# Patient Record
Sex: MALE | Race: WHITE | NOT HISPANIC OR LATINO | Employment: FULL TIME | URBAN - METROPOLITAN AREA
[De-identification: names, ages, dates, MRNs, and addresses within clinical notes are randomized per-mention and may not be internally consistent; named-entity substitution may affect disease eponyms.]

---
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
## 2018-01-02 ENCOUNTER — TELEPHONE (OUTPATIENT)
Dept: FAMILY MEDICINE | Facility: CLINIC | Age: 18
End: 2018-01-02

## 2018-01-31 NOTE — PROGRESS NOTES
"01/31/18 - Pt came in with mother and demanded an appt - pt was scheduled for the day before and was a no show.  Mother was very upset saying we messed up the appt.  We offered to work him in within the hour which they accepted.  20 minutes later mom left stating she needed to go as she had a different appointment leaving patient here to be seen.  At 9am mother called back to reschedule patient.  States that the patient walked out since he did not want to wait and she did not want to wait a long time for the reschedule.  We rescheduled the patient for next Friday 2/9 at 9am for 40 minutes, which was repeated back by the mother to eliminate confusion.     Lavern Wolfe, Medical Assistant    SUBJECTIVE:   CC: Darwin Todd is an 17 year old male who presents for preventative health visit with his mother.     Healthy Habits:    Do you get at least three servings of calcium containing foods daily (dairy, green leafy vegetables, etc.)? no, taking calcium and/or vitamin D supplement: no    Amount of exercise or daily activities, outside of work: none    Problems taking medications regularly No    Medication side effects: No    Have you had an eye exam in the past two years? no    Do you see a dentist twice per year? yes    Do you have sleep apnea, excessive snoring or daytime drowsiness?no     Sickle Cell Trait -- No hx of crises.     Allergies -- Claritin 10 mg prn daily. Seasonal, spring and fall.     Constipation -- Using Miralax regularly. Not currently using Dulcolax suppositories, as previously prescribed.     Circulation -- Pt reported issues with hands and feet - fingers and toes can \"turn blue and white\". He stated that these problems started ~2 months ago. Sx have improved some with regular exercise - sit ups, push ups, stretching, etc.     Testicular Pain -- Darwin stated that his testicles \"hurt\", but denied any risk for STI's. Pt's mother stated that pt is not sexually active and never has been - pt " "confirmed this.     Anxiety/Depression/Marijuana Use -- He is being seen by Mental Health Counseling. Pt's mother stated that both she and her son are struggling with anxiety - she is about to start using Lexapro per therapist - she is interested in him starting this at the same time she does. He has tried Celexa and Zoloft in the past - Zoloft worked in the past, 50 mg.     He reported that he has not been smoking marijuana or tobacco for \"a while now\". Denied alcohol or other drug use.     Prediabetes -- Hx of type 2 in mother, type 1 in father.     Lab Results   Component Value Date    A1C 5.5 07/22/2016     Glucose   Date Value Ref Range Status   02/09/2018 81 70 - 99 mg/dL Final     Creatinine   Date Value Ref Range Status   02/09/2018 0.89 0.50 - 1.00 mg/dL Final     Lipids -- Pt's mother reported that he \"has had super high cholesterol since he was little\".     Component Name  8/26/2014     CHOLESTEROL,TOTAL 308 (H)   TRIGLYCERIDES 110   HDL CHOLESTEROL 38 (L)   NON-HDL CHOLESTEROL 270 (H)   CHOL/HDL RATIO    8.11 (H)   LDL CHOLESTEROL 248 (H)       Past/recent records reviewed and discussed for -- family hx, social hx, surgical hx, medications, immunizations, allergies.      Today's PHQ-2 Score:   PHQ-2 ( 1999 Pfizer) 2/9/2018 5/5/2017   Q1: Little interest or pleasure in doing things 1 0   Q2: Feeling down, depressed or hopeless 1 0   PHQ-2 Score 2 0     Abuse: Current or Past(Physical, Sexual or Emotional)- No  Do you feel safe in your environment - Yes    Social History   Substance Use Topics     Smoking status: Former Smoker     Packs/day: 0.25     Smokeless tobacco: Never Used      Comment:  quit 1/2018     Alcohol use No      Comment: 1-5 per year      If you drink alcohol do you typically have >3 drinks per day or >7 drinks per week? No                      Last PSA: No results found for: PSA    Reviewed orders with patient. Reviewed health maintenance and updated orders accordingly - Yes    Reviewed " and updated as needed this visit by clinical staff  Tobacco  Allergies  Meds  Med Hx  Surg Hx  Fam Hx  Soc Hx      Reviewed and updated as needed this visit by Provider        Health Maintenance     Colonoscopy:  Due at age 48   FIT:  Due at age 38              PSA:  Due at age 38   DEXA:  n/a    There are no preventive care reminders to display for this patient.    Current Problem List    Patient Active Problem List   Diagnosis     Prediabetes     Hyperlipidemia LDL goal <100     Sickle cell trait (H)     Mild major depression (H)     Anxiety     Marijuana use     History of iron deficiency anemia     Environmental allergies     Raynaud's disease without gangrene       Past Medical History    Past Medical History:   Diagnosis Date     Anxiety      Environmental allergies      Hyperlipidemia LDL goal <100      Marijuana use      Mild major depression (H)      Prediabetes      Sickle cell trait (H)        Past Surgical History    Past Surgical History:   Procedure Laterality Date     ESOPHAGOSCOPY, GASTROSCOPY, DUODENOSCOPY (EGD), COMBINED N/A 8/24/2016    normal     NO HISTORY OF SURGERY         Current Medications    Current Outpatient Prescriptions   Medication Sig Dispense Refill     polyethylene glycol (MIRALAX) powder Take 17 g (1 capful) by mouth daily In 8oz of water or juice 1530 g 3     sertraline (ZOLOFT) 50 MG tablet Take 1 tablet (50 mg) by mouth daily 90 tablet 3     loratadine (CLARITIN) 10 MG tablet TK 1 T PO D  0       Allergies    Allergies   Allergen Reactions     Seasonal Allergies        Immunizations    Immunization History   Administered Date(s) Administered     DTAP (<7y) 1998, 1998, 03/08/1999, 04/04/2001, 08/20/2002, 03/25/2003     HEPA 09/11/2006, 02/01/2008     HPV9 02/09/2018     HepB 1998, 1998, 03/08/1999     Hib (PRP-T) 1998, 1998, 03/08/1999, 07/02/1999     Influenza (High Dose) 3 valent vaccine 10/11/2010, 10/12/2012, 10/07/2013, 10/01/2014,  09/22/2015     Influenza Vaccine IM 3yrs+ 4 Valent IIV4 10/19/2017     MMR 07/02/1999, 08/20/2002     Meningococcal (Menomune ) 08/02/2010, 06/08/2015     Pneumo Conj 13-V (2010&after) 02/09/2018     Poliovirus, inactivated (IPV) 1998, 1998, 03/08/1999, 08/20/2002, 03/25/2003     Tdap (Adacel,Boostrix) 08/02/2010     Varicella 03/25/2003, 09/11/2006       Family History    Family History   Problem Relation Age of Onset     Pancreatitis Father      DIABETES Father      Hyperlipidemia Father      Substance Abuse Father      DIABETES Mother      Hyperlipidemia Mother      Liver Disease Mother      fatty     DIABETES Brother      pre-diabetes     CANCER Paternal Grandmother 68     stomach     DIABETES Paternal Grandmother      Glaucoma Maternal Grandmother      Hypertension Maternal Grandmother      Hyperlipidemia Maternal Grandmother      Suicide Maternal Grandfather        Social History    Social History     Social History     Marital status: Single     Spouse name: N/A     Number of children: 0     Years of education: N/A     Occupational History     unloads [employer]       Social History Main Topics     Smoking status: Former Smoker     Packs/day: 0.25     Smokeless tobacco: Never Used      Comment:  quit 1/2018     Alcohol use No      Comment: 1-5 per year     Drug use: No     Sexual activity: No     Other Topics Concern     Parent/Sibling W/ Cabg, Mi Or Angioplasty Before 65f 55m? No     Caffeine Concern Yes     5 cans diet daily     Exercise Yes     starting     Seat Belt Yes     Social History Narrative       ROS:  Constitutional, HEENT, cardiovascular, pulmonary, GI, , musculoskeletal, neuro, skin, endocrine and psych systems are negative, except as in HPI or otherwise noted     This document serves as a record of the services and decisions personally performed and made by Cuba Esquivel MD FAA. It was created on their behalf by Shelton Urrutia, a trained medical scribe. The creation of this  "document is based the provider's statements to the medical scribe.  Shelton Urrutia February 9, 2018 9:17 AM      OBJECTIVE:   /72  Pulse 62  Temp 98.2  F (36.8  C) (Oral)  Ht 5' 7\" (1.702 m)  Wt 194 lb (88 kg)  SpO2 98%  BMI 30.38 kg/m2  EXAM:  GENERAL: healthy, alert and no distress, obese  HENT: ear canals and TM's normal upon viewing with otoscope, nose and mouth without ulcers or lesions upon viewing with otoscope  RESP: lungs clear to auscultation - no rales, no rhonchi, no wheezes  CV: regular rates and rhythm, normal S1 S2, no S3 or S4 and no murmur, no click or rub - no carotid bruits, normal peripheral pulses  ABDOMEN: soft, no tenderness, no  hepatosplenomegaly, no masses, normal bowel sounds  MS: extremities- no gross deformities noted, no edema  SKIN: no suspicious lesions, no rashes to visible skin  : testicles normal without atrophy or masses, no hernias, penis normal without urethral discharge  NEURO: mentation intact and speech normal  PSYCH: affect normal/flat    ASSESSMENT/PLAN:       ICD-10-CM    1. Encounter for routine adult health examination without abnormal findings Z00.00 US PAMELA Doppler with Exercise Bilateral     Comprehensive metabolic panel     Lipid panel reflex to direct LDL Fasting     CK total     CBC with platelets     TSH with free T4 reflex     Hemoglobin A1c     Albumin Random Urine Quantitative with Creat Ratio     *UA reflex to Microscopic and Culture ([facility] (except [place] and [place])     Iron and iron binding capacity     Ferritin     sertraline (ZOLOFT) 50 MG tablet   2. Prediabetes R73.03 Comprehensive metabolic panel     Lipid panel reflex to direct LDL Fasting     Hemoglobin A1c     Albumin Random Urine Quantitative with Creat Ratio     *UA reflex to Microscopic and Culture ([facility] (except [place] and [place])   3. Hyperlipidemia LDL goal <100 E78.5 Comprehensive metabolic panel     Lipid panel reflex to direct " LDL Fasting     CK total   4. Mild major depression (H) F32.0 sertraline (ZOLOFT) 50 MG tablet   5. Anxiety F41.9 sertraline (ZOLOFT) 50 MG tablet   6. Sickle cell trait (H) D57.3 CBC with platelets   7. History of iron deficiency anemia Z86.2 CBC with platelets     Iron and iron binding capacity     Ferritin   8. Constipation, unspecified constipation type K59.00 polyethylene glycol (MIRALAX) powder   9. Irritable bowel syndrome with both constipation and diarrhea K58.2 polyethylene glycol (MIRALAX) powder   10. Raynaud's disease without gangrene I73.00    11. Environmental allergies Z91.09    12. Medication monitoring encounter Z51.81    13. Need for HPV vaccine Z23 HC HPV VAC 9V 3 DOSE IM     VACCINE ADMINISTRATION, INITIAL     CANCELED: HPV High Risk Types DNA Cervical   14. Need for pneumococcal vaccination Z23 PNEUMOCOCCAL CONJ VACCINE 13 VALENT IM     VACCINE ADMINISTRATION, EACH ADDITIONAL     Discussed treatment/modality options, including risk and benefits, he desires advised 1 multivitamin per day, advised calcium 2082-1343 mg/d and Vitamin D 800-1200 IU/d, advised dentist every 6 months, advised diet, exercise, and weight loss, advised opthalmologist every 1-2 years, advised self testicular exam q month, diet discussed, further diagnostic(s), further health care maintenance, further imaging (PAMELA US exercise), further lab(s), medication refill(s), new medications (Zoloft 50 mg), OTC meds, immunizations (Prevnar, HPV), and observation. All diagnosis above reviewed and noted above, otherwise stable.  See [facility] orders for further details.  Follow up in 3-4 week(s) and as needed.    There are no preventive care reminders to display for this patient.  COUNSELING:  Reviewed preventive health counseling, as reflected in patient instructions     reports that he has quit smoking. He smoked 0.25 packs per day. He has never used smokeless tobacco.    Estimated body mass index is 30.38 kg/(m^2) as calculated from  "the following:    Height as of this encounter: 5' 7\" (1.702 m).    Weight as of this encounter: 194 lb (88 kg).   Weight management plan: Discussed healthy diet and exercise guidelines and patient will follow up in 12 months in clinic to re-evaluate.    Counseling Resources:  ATP IV Guidelines  Pooled Cohorts Equation Calculator  FRAX Risk Assessment  ICSI Preventive Guidelines  Dietary Guidelines for Americans, 2010  USDA's MyPlate  ASA Prophylaxis  Lung CA Screening    The information in this document, created by the medical scribe for me, accurately reflects the services I personally performed and the decisions made by me. I have reviewed and approved this document for accuracy.   Cuba Esquivel MD FAAFP            Cuba Esquivel MD, FAA[address]  [id number] [phone] Fax    "

## 2018-02-09 ENCOUNTER — OFFICE VISIT (OUTPATIENT)
Dept: FAMILY MEDICINE | Facility: CLINIC | Age: 18
End: 2018-02-09
Payer: COMMERCIAL

## 2018-02-09 VITALS
DIASTOLIC BLOOD PRESSURE: 72 MMHG | OXYGEN SATURATION: 98 % | SYSTOLIC BLOOD PRESSURE: 122 MMHG | TEMPERATURE: 98.2 F | HEIGHT: 67 IN | WEIGHT: 194 LBS | HEART RATE: 62 BPM | BODY MASS INDEX: 30.45 KG/M2

## 2018-02-09 DIAGNOSIS — R73.03 PREDIABETES: ICD-10-CM

## 2018-02-09 DIAGNOSIS — Z00.00 ENCOUNTER FOR ROUTINE ADULT HEALTH EXAMINATION WITHOUT ABNORMAL FINDINGS: Primary | ICD-10-CM

## 2018-02-09 DIAGNOSIS — Z86.2 HISTORY OF IRON DEFICIENCY ANEMIA: ICD-10-CM

## 2018-02-09 DIAGNOSIS — F41.9 ANXIETY: ICD-10-CM

## 2018-02-09 DIAGNOSIS — K59.00 CONSTIPATION, UNSPECIFIED CONSTIPATION TYPE: ICD-10-CM

## 2018-02-09 DIAGNOSIS — Z51.81 MEDICATION MONITORING ENCOUNTER: ICD-10-CM

## 2018-02-09 DIAGNOSIS — K58.2 IRRITABLE BOWEL SYNDROME WITH BOTH CONSTIPATION AND DIARRHEA: ICD-10-CM

## 2018-02-09 DIAGNOSIS — E78.5 HYPERLIPIDEMIA LDL GOAL <100: ICD-10-CM

## 2018-02-09 DIAGNOSIS — F32.0 MILD MAJOR DEPRESSION (H): ICD-10-CM

## 2018-02-09 DIAGNOSIS — Z23 NEED FOR HPV VACCINE: ICD-10-CM

## 2018-02-09 DIAGNOSIS — D57.3 SICKLE CELL TRAIT (H): ICD-10-CM

## 2018-02-09 DIAGNOSIS — I73.00 RAYNAUD'S DISEASE WITHOUT GANGRENE: ICD-10-CM

## 2018-02-09 DIAGNOSIS — Z91.09 ENVIRONMENTAL ALLERGIES: ICD-10-CM

## 2018-02-09 DIAGNOSIS — Z23 NEED FOR PNEUMOCOCCAL VACCINATION: ICD-10-CM

## 2018-02-09 LAB
ALBUMIN SERPL-MCNC: 4.2 G/DL (ref 3.4–5)
ALBUMIN UR-MCNC: NEGATIVE MG/DL
ALP SERPL-CCNC: 124 U/L (ref 65–260)
ALT SERPL W P-5'-P-CCNC: 24 U/L (ref 0–50)
ANION GAP SERPL CALCULATED.3IONS-SCNC: 9 MMOL/L (ref 3–14)
APPEARANCE UR: CLEAR
AST SERPL W P-5'-P-CCNC: 17 U/L (ref 0–35)
BILIRUB SERPL-MCNC: 0.4 MG/DL (ref 0.2–1.3)
BILIRUB UR QL STRIP: NEGATIVE
BUN SERPL-MCNC: 10 MG/DL (ref 7–30)
CALCIUM SERPL-MCNC: 9.5 MG/DL (ref 8.5–10.1)
CHLORIDE SERPL-SCNC: 108 MMOL/L (ref 98–110)
CHOLEST SERPL-MCNC: 231 MG/DL
CK SERPL-CCNC: 80 U/L (ref 30–300)
CO2 SERPL-SCNC: 25 MMOL/L (ref 20–32)
COLOR UR AUTO: YELLOW
CREAT SERPL-MCNC: 0.89 MG/DL (ref 0.5–1)
CREAT UR-MCNC: 302 MG/DL
ERYTHROCYTE [DISTWIDTH] IN BLOOD BY AUTOMATED COUNT: 12.9 % (ref 10–15)
FERRITIN SERPL-MCNC: 84 NG/ML (ref 26–388)
GFR SERPL CREATININE-BSD FRML MDRD: >90 ML/MIN/1.7M2
GLUCOSE SERPL-MCNC: 81 MG/DL (ref 70–99)
GLUCOSE UR STRIP-MCNC: NEGATIVE MG/DL
HBA1C MFR BLD: 5.6 % (ref 4.3–6)
HCT VFR BLD AUTO: 43.9 % (ref 40–53)
HDLC SERPL-MCNC: 55 MG/DL
HGB BLD-MCNC: 14.5 G/DL (ref 13.3–17.7)
HGB UR QL STRIP: NEGATIVE
IRON SATN MFR SERPL: 33 % (ref 15–46)
IRON SERPL-MCNC: 123 UG/DL (ref 35–180)
KETONES UR STRIP-MCNC: NEGATIVE MG/DL
LDLC SERPL CALC-MCNC: 160 MG/DL
LEUKOCYTE ESTERASE UR QL STRIP: NEGATIVE
MCH RBC QN AUTO: 25.2 PG (ref 26.5–33)
MCHC RBC AUTO-ENTMCNC: 33 G/DL (ref 31.5–36.5)
MCV RBC AUTO: 76 FL (ref 78–100)
MICROALBUMIN UR-MCNC: 11 MG/L
MICROALBUMIN/CREAT UR: 3.64 MG/G CR (ref 0–17)
NITRATE UR QL: NEGATIVE
NONHDLC SERPL-MCNC: 176 MG/DL
PH UR STRIP: 5.5 PH (ref 5–7)
PLATELET # BLD AUTO: 241 10E9/L (ref 150–450)
POTASSIUM SERPL-SCNC: 3.7 MMOL/L (ref 3.4–5.3)
PROT SERPL-MCNC: 7.6 G/DL (ref 6.8–8.8)
RBC # BLD AUTO: 5.75 10E12/L (ref 4.4–5.9)
SODIUM SERPL-SCNC: 142 MMOL/L (ref 133–144)
SOURCE: NORMAL
SP GR UR STRIP: 1.02 (ref 1–1.03)
TIBC SERPL-MCNC: 377 UG/DL (ref 240–430)
TRIGL SERPL-MCNC: 78 MG/DL
TSH SERPL DL<=0.005 MIU/L-ACNC: 2.35 MU/L (ref 0.4–4)
UROBILINOGEN UR STRIP-ACNC: 0.2 EU/DL (ref 0.2–1)
WBC # BLD AUTO: 8 10E9/L (ref 4–11)

## 2018-02-09 PROCEDURE — 80053 COMPREHEN METABOLIC PANEL: CPT | Performed by: FAMILY MEDICINE

## 2018-02-09 PROCEDURE — 81003 URINALYSIS AUTO W/O SCOPE: CPT | Performed by: FAMILY MEDICINE

## 2018-02-09 PROCEDURE — 90670 PCV13 VACCINE IM: CPT | Performed by: FAMILY MEDICINE

## 2018-02-09 PROCEDURE — 83550 IRON BINDING TEST: CPT | Performed by: FAMILY MEDICINE

## 2018-02-09 PROCEDURE — 90471 IMMUNIZATION ADMIN: CPT | Performed by: FAMILY MEDICINE

## 2018-02-09 PROCEDURE — 90472 IMMUNIZATION ADMIN EACH ADD: CPT | Performed by: FAMILY MEDICINE

## 2018-02-09 PROCEDURE — 85027 COMPLETE CBC AUTOMATED: CPT | Performed by: FAMILY MEDICINE

## 2018-02-09 PROCEDURE — 80061 LIPID PANEL: CPT | Performed by: FAMILY MEDICINE

## 2018-02-09 PROCEDURE — 99395 PREV VISIT EST AGE 18-39: CPT | Mod: 25 | Performed by: FAMILY MEDICINE

## 2018-02-09 PROCEDURE — 82550 ASSAY OF CK (CPK): CPT | Performed by: FAMILY MEDICINE

## 2018-02-09 PROCEDURE — 83036 HEMOGLOBIN GLYCOSYLATED A1C: CPT | Performed by: FAMILY MEDICINE

## 2018-02-09 PROCEDURE — 82728 ASSAY OF FERRITIN: CPT | Performed by: FAMILY MEDICINE

## 2018-02-09 PROCEDURE — 90651 9VHPV VACCINE 2/3 DOSE IM: CPT | Performed by: FAMILY MEDICINE

## 2018-02-09 PROCEDURE — 83540 ASSAY OF IRON: CPT | Performed by: FAMILY MEDICINE

## 2018-02-09 PROCEDURE — 82043 UR ALBUMIN QUANTITATIVE: CPT | Performed by: FAMILY MEDICINE

## 2018-02-09 PROCEDURE — 84443 ASSAY THYROID STIM HORMONE: CPT | Performed by: FAMILY MEDICINE

## 2018-02-09 PROCEDURE — 36415 COLL VENOUS BLD VENIPUNCTURE: CPT | Performed by: FAMILY MEDICINE

## 2018-02-09 RX ORDER — BISACODYL 10 MG
10 SUPPOSITORY, RECTAL RECTAL DAILY PRN
Qty: 10 SUPPOSITORY | Refills: 1 | Status: CANCELLED | OUTPATIENT
Start: 2018-02-09

## 2018-02-09 RX ORDER — POLYETHYLENE GLYCOL 3350 17 G/17G
1 POWDER, FOR SOLUTION ORAL DAILY
Qty: 1530 G | Refills: 3 | Status: SHIPPED | OUTPATIENT
Start: 2018-02-09 | End: 2019-07-15

## 2018-02-09 ASSESSMENT — ANXIETY QUESTIONNAIRES
GAD7 TOTAL SCORE: 15
6. BECOMING EASILY ANNOYED OR IRRITABLE: SEVERAL DAYS
IF YOU CHECKED OFF ANY PROBLEMS ON THIS QUESTIONNAIRE, HOW DIFFICULT HAVE THESE PROBLEMS MADE IT FOR YOU TO DO YOUR WORK, TAKE CARE OF THINGS AT HOME, OR GET ALONG WITH OTHER PEOPLE: SOMEWHAT DIFFICULT
1. FEELING NERVOUS, ANXIOUS, OR ON EDGE: NEARLY EVERY DAY
2. NOT BEING ABLE TO STOP OR CONTROL WORRYING: NEARLY EVERY DAY
5. BEING SO RESTLESS THAT IT IS HARD TO SIT STILL: NOT AT ALL
3. WORRYING TOO MUCH ABOUT DIFFERENT THINGS: NEARLY EVERY DAY
7. FEELING AFRAID AS IF SOMETHING AWFUL MIGHT HAPPEN: NEARLY EVERY DAY

## 2018-02-09 ASSESSMENT — PATIENT HEALTH QUESTIONNAIRE - PHQ9: 5. POOR APPETITE OR OVEREATING: MORE THAN HALF THE DAYS

## 2018-02-09 NOTE — NURSING NOTE
"No chief complaint on file.      Initial /72  Pulse 62  Temp 98.2  F (36.8  C) (Oral)  Ht 5' 7\" (1.702 m)  Wt 194 lb (88 kg)  SpO2 98%  BMI 30.38 kg/m2 Estimated body mass index is 30.38 kg/(m^2) as calculated from the following:    Height as of this encounter: 5' 7\" (1.702 m).    Weight as of this encounter: 194 lb (88 kg)..  BP completed using cuff size: sergo Wolfe MA  "

## 2018-02-09 NOTE — PATIENT INSTRUCTIONS
Initiate use of Zoloft 50 mg daily    Continue with use of Miralax daily (one capful)    Follow up for Exercise PAMELA Ultrasound screening to check on circulation    Follow up for second HPV vaccination in 2 months - make nurse-only appointment ahead of time    Follow up in 3-4 weeks for recheck                   Raynaud's Phenomenon  What is Raynaud's phenomenon?   Raynaud's phenomenon is a disorder of the small blood vessels that feed the skin. During an attack of Raynaud's, these blood vessels narrow briefly, limiting blood flow to the skin. The skin first turns white, then blue. Then the skin turns red as the vessels relax and blood flows again. Hands and feet are most commonly affected, but Raynaud's phenomenon can affect other areas such as the nose and ears.   Women between the ages of 15 and 50 are most often affected, but anyone can have the problem.   How does it occur?   For most people, an attack is usually triggered by exposure to cold or emotional stress. For example, reaching into a refrigerator may trigger an attack.   There are 2 forms of Raynaud's phenomenon. Most people who have Raynaud's phenomenon have the primary form (the milder version). Its cause is not known.   Secondary Raynaud's phenomenon is caused by another disease or condition and is a more serious disorder. Connective tissue diseases are the most common cause. Medical conditions that may cause secondary Raynaud's phenomenon include:   Scleroderma (a thickening and hardening of the skin and other body tissues). Raynaud's phenomenon is seen in approximately 85 to 95% of people with scleroderma.   Systemic lupus erythematosus (a chronic inflammation of the skin and organ systems).   Rheumatoid arthritis (a chronic inflammation and swelling of tissue in the joints).   Blood flow reduction (problems that slow or stop blood flow in a vessel, such as inflammation and hardening of the arteries).   Pulmonary hypertension (high blood pressure in  the lungs).   Sj?gren's syndrome (a disorder in which immune cells attack and destroy the glands that produce tears and saliva).   Polymyositis (a chronic disease that causes inflammation and weakness of the muscles).   Dermatomyositis (a form of polymyositis with skin symptoms).   Carpal tunnel syndrome (a painful disorder of the hand and wrist caused by pressure on a nerve in the wrist).   Some drugs may cause Raynaud's phenomenon. Examples of such drugs are beta blockers used to treat high blood pressure, ergotamine medicines used for migraine headaches, anticancer drugs, nonprescription cold medicines, and narcotics. Smoking can also cause Raynaud's phenomenon.   Injuries from frostbite, surgery, or some jobs may also cause Raynaud's phenomenon. Some workers in the plastics industry who are exposed to vinyl chloride develop a scleroderma-like illness and have Raynaud's phenomenon. Regular use of machinery such as chain saws and vibrating drills can hurt blood vessels.   What are the symptoms?   Symptoms include changes in skin color (white to blue to red) and skin temperature (the area feels cooler). It is common for the area to feel numb or prickly as the hands or feet warm up again and the blood flow returns.   How is it diagnosed?   Your healthcare provider will ask about your medical history and examine you. You may have blood tests. Depending on your history and exam, your provider may check for diseases or other conditions that cause secondary Raynaud's.   How is it treated?   Most healthcare providers recommend nondrug treatments and self-help measures first, as described below in the section on taking care of yourself.   Several kinds of medicines are sometimes used to treat severe Raynaud's symptoms. They all improve circulation. Types of drugs that might be prescribed are calcium channel blockers, alpha blockers, and vasodilators. Nitroglycerin paste, which is applied to the fingers, helps heal skin  "sores.   A drug may become less effective over time. Women of childbearing age should know that the medicines used to treat Raynaud's phenomenon might affect the baby. If you are pregnant or are trying to become pregnant, talk with your healthcare provider before taking these medicines.   If you are taking a medicine that seems to be causing Raynaud's phenomenon, let your healthcare provider know. You may need to change your medicine or dosage.   How long do the effects last?   Each episode of symptoms usually lasts for just a matter of minutes, but some may last more than an hour.   Raynaud's phenomenon cannot be cured, but most people are able to manage the symptoms.   How can I take care of myself?   Protect yourself from cold and keep all parts of your body warm. When you are outdoors in the winter, wear scarves, warm socks, boots, and mittens. (Mittens are better than gloves, which allow too much heat to escape.) Make sure your wrists are covered. Indoors, wear socks and comfortable shoes. When taking food out of the refrigerator or freezer, use mittens, oven mitts, or potholders. Avoid very cold air conditioning.   Guard against cuts, bruises, and other injuries to the areas affected by Raynaud's phenomenon.   If you smoke, quit. Smoking can further decrease blood flow to the fingers. In some cases smoking itself can cause Raynaud's.   You may be able to prevent or stop attacks using biofeedback, a technique in which you are taught to \"think\" your fingers or toes warm.   Control stress.   Exercise regularly, according to your healthcare provider's recommendations.   Keep your regular follow-up appointments with your healthcare provider. See your healthcare provider sooner if you have questions or concerns.   For more information, you may wish to contact:   [employer] [place] [employer] and [employer] and [employer]   Phone: 138-44-EQQSX ([phone]) (free of charge)   Web site: " http://www.niams.nih.gov    Preventive Health Recommendations  Male Ages 18 - 25     Yearly exam:             See your health care provider every year in order to  o   Review health changes.   o   Discuss preventive care.    o   Review your medicines if your doctor has prescribed any.    You should be tested each year for STDs (sexually transmitted diseases).     Talk to your provider about cholesterol testing.      If you are at risk for diabetes, you should have a diabetes test (fasting glucose).    Shots: Get a flu shot each year. Get a tetanus shot every 10 years.     Nutrition:    Eat at least 5 servings of fruits and vegetables daily.     Eat whole-grain bread, whole-wheat pasta and brown rice instead of white grains and rice.     Talk to your provider about calcium and Vitamin D.     Lifestyle    Exercise for at least 150 minutes a week (30 minutes a day, 5 days a week). This will help you control your weight and prevent disease.     Limit alcohol to one drink per day.     No smoking.     Wear sunscreen to prevent skin cancer.     See your dentist every six months for an exam and cleaning.

## 2018-02-09 NOTE — LETTER
My Depression Action Plan  Name: Darwin Todd   Date of Birth 2000  Date: 2/9/2018    My doctor: Dai Lauren [place]   My clinic: [facility] [address] [address] [phone]          GREEN    ZONE   Good Control    What it looks like:     Things are going generally well. You have normal up s and down s. You may even feel depressed from time to time, but bad moods usually last less than a day.   What you need to do:  1. Continue to care for yourself (see self care plan)  2. Check your depression survival kit and update it as needed  3. Follow your physician s recommendations including any medication.  4. Do not stop taking medication unless you consult with your physician first.           YELLOW         ZONE Getting Worse    What it looks like:     Depression is starting to interfere with your life.     It may be hard to get out of bed; you may be starting to isolate yourself from others.    Symptoms of depression are starting to last most all day and this has happened for several days.     You may have suicidal thoughts but they are not constant.   What you need to do:     1. Call your care team, your response to treatment will improve if you keep your care team informed of your progress. Yellow periods are signs an adjustment may need to be made.     2. Continue your self-care, even if you have to fake it!    3. Talk to someone in your support network    4. Open up your depression survival kit           RED    ZONE Medical Alert - Get Help    What it looks like:     Depression is seriously interfering with your life.     You may experience these or other symptoms: You can t get out of bed most days, can t work or engage in other necessary activities, you have trouble taking care of basic hygiene, or basic responsibilities, thoughts of suicide or death that will not go away, self-injurious behavior.     What you need to do:  1. Call  your care team and request a same-day appointment. If they are not available (weekends or after hours) call your local crisis line, emergency room or 911.      Electronically signed by: Lavern Wolfe, February 9, 2018    Depression Self Care Plan / Survival Kit    Self-Care for Depression  Here s the deal. Your body and mind are really not as separate as most people think.  What you do and think affects how you feel and how you feel influences what you do and think. This means if you do things that people who feel good do, it will help you feel better.  Sometimes this is all it takes.  There is also a place for medication and therapy depending on how severe your depression is, so be sure to consult with your medical provider and/ or Behavioral Health Consultant if your symptoms are worsening or not improving.     In order to better manage my stress, I will:    Exercise  Get some form of exercise, every day. This will help reduce pain and release endorphins, the  feel good  chemicals in your brain. This is almost as good as taking antidepressants!  This is not the same as joining a gym and then never going! (they count on that by the way ) It can be as simple as just going for a walk or doing some gardening, anything that will get you moving.      Hygiene   Maintain good hygiene (Get out of bed in the morning, Make your bed, Brush your teeth, Take a shower, and Get dressed like you were going to work, even if you are unemployed).  If your clothes don't fit try to get ones that do.    Diet  I will strive to eat foods that are good for me, drink plenty of water, and avoid excessive sugar, caffeine, alcohol, and other mood-altering substances.  Some foods that are helpful in depression are: complex carbohydrates, B vitamins, flaxseed, fish or fish oil, fresh fruits and vegetables.    Psychotherapy  I agree to participate in Individual Therapy (if recommended).    Medication  If prescribed medications, I agree to take  them.  Missing doses can result in serious side effects.  I understand that drinking alcohol, or other illicit drug use, may cause potential side effects.  I will not stop my medication abruptly without first discussing it with my provider.    Staying Connected With Others  I will stay in touch with my friends, family members, and my primary care provider/team.    Use your imagination  Be creative.  We all have a creative side; it doesn t matter if it s oil painting, sand castles, or mud pies! This will also kick up the endorphins.    Witness Beauty  (AKA stop and smell the roses) Take a look outside, even in mid-winter. Notice colors, textures. Watch the squirrels and birds.     Service to others  Be of service to others.  There is always someone else in need.  By helping others we can  get out of ourselves  and remember the really important things.  This also provides opportunities for practicing all the other parts of the program.    Humor  Laugh and be silly!  Adjust your TV habits for less news and crime-drama and more comedy.    Control your stress  Try breathing deep, massage therapy, biofeedback, and meditation. Find time to relax each day.     My support system    Clinic Contact:  Phone number:    Contact 1:  Phone number:    Contact 2:  Phone number:    Mu-ism/:  Phone number:    Therapist:  Phone number:    Local crisis center:    Phone number:    Other community support:  Phone number:

## 2018-02-09 NOTE — MR AVS SNAPSHOT
After Visit Summary   2/9/2018    Darwin Todd    MRN: [id number]           Patient Information     Date Of Birth          2000        Visit Information        Provider Department      2/9/2018 9:00 AM Cuba Esquivel MD [facility] [place]        Today's Diagnoses     Encounter for routine adult health examination without abnormal findings    -  1    Prediabetes        Hyperlipidemia LDL goal <100        Mild major depression (H)        Anxiety        Sickle cell trait (H)        Constipation, unspecified constipation type        History of iron deficiency anemia        Irritable bowel syndrome with both constipation and diarrhea        Raynaud's disease without gangrene        Environmental allergies        Medication monitoring encounter        Need for HPV vaccine        Need for pneumococcal vaccination          Care Instructions    Initiate use of Zoloft 50 mg daily    Continue with use of Miralax daily (one capful)    Follow up for Exercise PAMELA Ultrasound screening to check on circulation    Follow up for second HPV vaccination in 2 months - make nurse-only appointment ahead of time    Follow up in 3-4 weeks for recheck                   Raynaud's Phenomenon  What is Raynaud's phenomenon?   Raynaud's phenomenon is a disorder of the small blood vessels that feed the skin. During an attack of Raynaud's, these blood vessels narrow briefly, limiting blood flow to the skin. The skin first turns white, then blue. Then the skin turns red as the vessels relax and blood flows again. Hands and feet are most commonly affected, but Raynaud's phenomenon can affect other areas such as the nose and ears.   Women between the ages of 15 and 50 are most often affected, but anyone can have the problem.   How does it occur?   For most people, an attack is usually triggered by exposure to cold or emotional stress. For example, reaching into a refrigerator may trigger an attack.   There are 2 forms of  Raynaud's phenomenon. Most people who have Raynaud's phenomenon have the primary form (the milder version). Its cause is not known.   Secondary Raynaud's phenomenon is caused by another disease or condition and is a more serious disorder. Connective tissue diseases are the most common cause. Medical conditions that may cause secondary Raynaud's phenomenon include:   Scleroderma (a thickening and hardening of the skin and other body tissues). Raynaud's phenomenon is seen in approximately 85 to 95% of people with scleroderma.   Systemic lupus erythematosus (a chronic inflammation of the skin and organ systems).   Rheumatoid arthritis (a chronic inflammation and swelling of tissue in the joints).   Blood flow reduction (problems that slow or stop blood flow in a vessel, such as inflammation and hardening of the arteries).   Pulmonary hypertension (high blood pressure in the lungs).   Sj?gren's syndrome (a disorder in which immune cells attack and destroy the glands that produce tears and saliva).   Polymyositis (a chronic disease that causes inflammation and weakness of the muscles).   Dermatomyositis (a form of polymyositis with skin symptoms).   Carpal tunnel syndrome (a painful disorder of the hand and wrist caused by pressure on a nerve in the wrist).   Some drugs may cause Raynaud's phenomenon. Examples of such drugs are beta blockers used to treat high blood pressure, ergotamine medicines used for migraine headaches, anticancer drugs, nonprescription cold medicines, and narcotics. Smoking can also cause Raynaud's phenomenon.   Injuries from frostbite, surgery, or some jobs may also cause Raynaud's phenomenon. Some workers in the plastics industry who are exposed to vinyl chloride develop a scleroderma-like illness and have Raynaud's phenomenon. Regular use of machinery such as chain saws and vibrating drills can hurt blood vessels.   What are the symptoms?   Symptoms include changes in skin color (white to blue  to red) and skin temperature (the area feels cooler). It is common for the area to feel numb or prickly as the hands or feet warm up again and the blood flow returns.   How is it diagnosed?   Your healthcare provider will ask about your medical history and examine you. You may have blood tests. Depending on your history and exam, your provider may check for diseases or other conditions that cause secondary Raynaud's.   How is it treated?   Most healthcare providers recommend nondrug treatments and self-help measures first, as described below in the section on taking care of yourself.   Several kinds of medicines are sometimes used to treat severe Raynaud's symptoms. They all improve circulation. Types of drugs that might be prescribed are calcium channel blockers, alpha blockers, and vasodilators. Nitroglycerin paste, which is applied to the fingers, helps heal skin sores.   A drug may become less effective over time. Women of childbearing age should know that the medicines used to treat Raynaud's phenomenon might affect the baby. If you are pregnant or are trying to become pregnant, talk with your healthcare provider before taking these medicines.   If you are taking a medicine that seems to be causing Raynaud's phenomenon, let your healthcare provider know. You may need to change your medicine or dosage.   How long do the effects last?   Each episode of symptoms usually lasts for just a matter of minutes, but some may last more than an hour.   Raynaud's phenomenon cannot be cured, but most people are able to manage the symptoms.   How can I take care of myself?   Protect yourself from cold and keep all parts of your body warm. When you are outdoors in the winter, wear scarves, warm socks, boots, and mittens. (Mittens are better than gloves, which allow too much heat to escape.) Make sure your wrists are covered. Indoors, wear socks and comfortable shoes. When taking food out of the refrigerator or freezer, use  "mittens, oven mitts, or potholders. Avoid very cold air conditioning.   Guard against cuts, bruises, and other injuries to the areas affected by Raynaud's phenomenon.   If you smoke, quit. Smoking can further decrease blood flow to the fingers. In some cases smoking itself can cause Raynaud's.   You may be able to prevent or stop attacks using biofeedback, a technique in which you are taught to \"think\" your fingers or toes warm.   Control stress.   Exercise regularly, according to your healthcare provider's recommendations.   Keep your regular follow-up appointments with your healthcare provider. See your healthcare provider sooner if you have questions or concerns.   For more information, you may wish to contact:   [employer]th[employer] and Musculoskeletal and Skin Diseases   Phone: [phone] ([phone]) (free of charge)   Web site: http://www.niams.nih.gov    Preventive Health Recommendations  Male Ages 18 - 25     Yearly exam:             See your health care provider every year in order to  o   Review health changes.   o   Discuss preventive care.    o   Review your medicines if your doctor has prescribed any.    You should be tested each year for STDs (sexually transmitted diseases).     Talk to your provider about cholesterol testing.      If you are at risk for diabetes, you should have a diabetes test (fasting glucose).    Shots: Get a flu shot each year. Get a tetanus shot every 10 years.     Nutrition:    Eat at least 5 servings of fruits and vegetables daily.     Eat whole-grain bread, whole-wheat pasta and brown rice instead of white grains and rice.     Talk to your provider about calcium and Vitamin D.     Lifestyle    Exercise for at least 150 minutes a week (30 minutes a day, 5 days a week). This will help you control your weight and prevent disease.     Limit alcohol to one drink per day.     No smoking.     Wear sunscreen to prevent skin cancer.     See your dentist every six months for " "an exam and cleaning.             Follow-ups after your visit        Follow-up notes from your care team     Return in about 1 month (around 3/9/2018).      Who to contact     If you have questions or need follow up information about today's clinic visit or your schedule please contact [facility] [place] directly at [phone].  Normal or non-critical lab and imaging results will be communicated to you by MyChart, letter or phone within 4 business days after the clinic has received the results. If you do not hear from us within 7 days, please contact the clinic through [employer] or phone. If you have a critical or abnormal lab result, we will notify you by phone as soon as possible.  Submit refill requests through [employer] or call your pharmacy and they will forward the refill request to us. Please allow 3 business days for your refill to be completed.          Additional Information About Your Visit        MyChart Information     [employer] lets you send messages to your doctor, view your test results, renew your prescriptions, schedule appointments and more. To sign up, go to [email or web address] . Click on \"Log in\" on the left side of the screen, which will take you to the Welcome page. Then click on \"Sign up Now\" on the right side of the page.     You will be asked to enter the access code listed below, as well as some personal information. Please follow the directions to create your username and password.     Your access code is: [email or web address]  Expires: 2018  9:03 AM     Your access code will  in 90 days. If you need help or a new code, please call your [place] clinic or [phone].        Care EveryWhere ID     This is your Care EveryWhere ID. This could be used by other organizations to access your [place] medical records  [id number]        Your Vitals Were     Pulse Temperature Height Pulse Oximetry BMI (Body Mass Index)       62 98.2  F (36.8  C) (Oral) 5' 7\" (1.702 m) 98% 30.38 " kg/m2        Blood Pressure from Last 3 Encounters:   02/09/18 122/72   12/19/17 128/83   05/05/17 110/60    Weight from Last 3 Encounters:   02/09/18 194 lb (88 kg) (90 %)*   12/19/17 189 lb 2.5 oz (85.8 kg) (88 %)*   05/05/17 190 lb (86.2 kg) (90 %)*     * Growth percentiles are based on CDC 2-20 Years data.              We Performed the Following     *UA reflex to Microscopic and Culture ([facility] (except [place] and [place])     Albumin Random Urine Quantitative with Creat Ratio     CBC with platelets     CK total     Comprehensive metabolic panel     DEPRESSION ACTION PLAN (DAP)     Ferritin     Hemoglobin A1c     HPV High Risk Types DNA Cervical     Iron and iron binding capacity     Lipid panel reflex to direct LDL Fasting     PNEUMOCOCCAL CONJ VACCINE 13 VALENT IM     TSH with free T4 reflex     US PAMELA Doppler with Exercise Bilateral          Today's Medication Changes          These changes are accurate as of 2/9/18  9:43 AM.  If you have any questions, ask your nurse or doctor.               Start taking these medicines.        Dose/Directions    sertraline 50 MG tablet   Commonly known as:  ZOLOFT   Used for:  Encounter for routine adult health examination without abnormal findings, Mild major depression (H), Anxiety   Started by:  Cuba Esquivel MD        Dose:  50 mg   Take 1 tablet (50 mg) by mouth daily   Quantity:  90 tablet   Refills:  3         Stop taking these medicines if you haven't already. Please contact your care team if you have questions.     bisacodyl 10 MG Suppository   Commonly known as:  DULCOLAX   Stopped by:  Cuba Esquivel MD                Where to get your medicines      These medications were sent to "[id number]  [place], [place] - [address] AT [employer] [address], [facility] [place] [address]    Hours:  24-hours Phone:  [phone]     polyethylene glycol powder    sertraline 50 MG tablet                Primary  Care Provider Office Phone # Fax #    [facility] [phone]       [address] [place] [address]        Equal Access to Services     LISA GONGORA : Hadii aad ku hadsilvestreo Sojaimieali, waaxda luqadaha, qaybta kaalmada adecesarioda, jackie esquivel laDeonkatt tsai. So [facility] [phone].    ATENCIÓN: Si habla español, tiene a everett disposición servicios gratuitos de asistencia lingüística. Llame al [phone].    We comply with applicable federal civil rights laws and [place] laws. We do not discriminate on the basis of race, color, national origin, age, disability, sex, sexual orientation, or gender identity.            Thank you!     Thank you for choosing [facility]  for your care. Our goal is always to provide you with excellent care. Hearing back from our patients is one way we can continue to improve our services. Please take a few minutes to complete the written survey that you may receive in the mail after your visit with us. Thank you!             Your Updated Medication List - Protect others around you: Learn how to safely use, store and throw away your medicines at www.disposemymeds.org.          This list is accurate as of 2/9/18  9:43 AM.  Always use your most recent med list.                   Brand Name Dispense Instructions for use Diagnosis    loratadine 10 MG tablet    CLARITIN     TK 1 T PO D        polyethylene glycol powder    MIRALAX    1530 g    Take 17 g (1 capful) by mouth daily In 8oz of water or juice    Irritable bowel syndrome with both constipation and diarrhea, Constipation, unspecified constipation type       sertraline 50 MG tablet    ZOLOFT    90 tablet    Take 1 tablet (50 mg) by mouth daily    Encounter for routine adult health examination without abnormal findings, Mild major depression (H), Anxiety

## 2018-02-09 NOTE — LETTER
My Depression Action Plan  Name: Darwin Todd   Date of Birth 2000  Date: 2/9/2018    My doctor: Dai Lauren [place]   My clinic: [facility] [address] [address] [phone]          GREEN    ZONE   Good Control    What it looks like:     Things are going generally well. You have normal up s and down s. You may even feel depressed from time to time, but bad moods usually last less than a day.   What you need to do:  1. Continue to care for yourself (see self care plan)  2. Check your depression survival kit and update it as needed  3. Follow your physician s recommendations including any medication.  4. Do not stop taking medication unless you consult with your physician first.           YELLOW         ZONE Getting Worse    What it looks like:     Depression is starting to interfere with your life.     It may be hard to get out of bed; you may be starting to isolate yourself from others.    Symptoms of depression are starting to last most all day and this has happened for several days.     You may have suicidal thoughts but they are not constant.   What you need to do:     1. Call your care team, your response to treatment will improve if you keep your care team informed of your progress. Yellow periods are signs an adjustment may need to be made.     2. Continue your self-care, even if you have to fake it!    3. Talk to someone in your support network    4. Open up your depression survival kit           RED    ZONE Medical Alert - Get Help    What it looks like:     Depression is seriously interfering with your life.     You may experience these or other symptoms: You can t get out of bed most days, can t work or engage in other necessary activities, you have trouble taking care of basic hygiene, or basic responsibilities, thoughts of suicide or death that will not go away, self-injurious behavior.     What you need to do:  1. Call  your care team and request a same-day appointment. If they are not available (weekends or after hours) call your local crisis line, emergency room or 911.      Electronically signed by: Lavern Wolfe, February 9, 2018    Depression Self Care Plan / Survival Kit    Self-Care for Depression  Here s the deal. Your body and mind are really not as separate as most people think.  What you do and think affects how you feel and how you feel influences what you do and think. This means if you do things that people who feel good do, it will help you feel better.  Sometimes this is all it takes.  There is also a place for medication and therapy depending on how severe your depression is, so be sure to consult with your medical provider and/ or Behavioral Health Consultant if your symptoms are worsening or not improving.     In order to better manage my stress, I will:    Exercise  Get some form of exercise, every day. This will help reduce pain and release endorphins, the  feel good  chemicals in your brain. This is almost as good as taking antidepressants!  This is not the same as joining a gym and then never going! (they count on that by the way ) It can be as simple as just going for a walk or doing some gardening, anything that will get you moving.      Hygiene   Maintain good hygiene (Get out of bed in the morning, Make your bed, Brush your teeth, Take a shower, and Get dressed like you were going to work, even if you are unemployed).  If your clothes don't fit try to get ones that do.    Diet  I will strive to eat foods that are good for me, drink plenty of water, and avoid excessive sugar, caffeine, alcohol, and other mood-altering substances.  Some foods that are helpful in depression are: complex carbohydrates, B vitamins, flaxseed, fish or fish oil, fresh fruits and vegetables.    Psychotherapy  I agree to participate in Individual Therapy (if recommended).    Medication  If prescribed medications, I agree to take  them.  Missing doses can result in serious side effects.  I understand that drinking alcohol, or other illicit drug use, may cause potential side effects.  I will not stop my medication abruptly without first discussing it with my provider.    Staying Connected With Others  I will stay in touch with my friends, family members, and my primary care provider/team.    Use your imagination  Be creative.  We all have a creative side; it doesn t matter if it s oil painting, sand castles, or mud pies! This will also kick up the endorphins.    Witness Beauty  (AKA stop and smell the roses) Take a look outside, even in mid-winter. Notice colors, textures. Watch the squirrels and birds.     Service to others  Be of service to others.  There is always someone else in need.  By helping others we can  get out of ourselves  and remember the really important things.  This also provides opportunities for practicing all the other parts of the program.    Humor  Laugh and be silly!  Adjust your TV habits for less news and crime-drama and more comedy.    Control your stress  Try breathing deep, massage therapy, biofeedback, and meditation. Find time to relax each day.     My support system    Clinic Contact:  Phone number:    Contact 1:  Phone number:    Contact 2:  Phone number:    Jewish/:  Phone number:    Therapist:  Phone number:    Local crisis center:    Phone number:    Other community support:  Phone number:

## 2018-02-10 ASSESSMENT — ANXIETY QUESTIONNAIRES: GAD7 TOTAL SCORE: 15

## 2018-02-10 ASSESSMENT — PATIENT HEALTH QUESTIONNAIRE - PHQ9: SUM OF ALL RESPONSES TO PHQ QUESTIONS 1-9: 16

## 2018-02-16 DIAGNOSIS — D57.3 SICKLE CELL TRAIT (H): Primary | ICD-10-CM

## 2018-02-16 DIAGNOSIS — D57.3 SICKLE CELL TRAIT (H): ICD-10-CM

## 2018-02-16 LAB
BASOPHILS # BLD AUTO: 0 10E9/L (ref 0–0.2)
BASOPHILS NFR BLD AUTO: 0.4 %
COPATH REPORT: NORMAL
DIFFERENTIAL METHOD BLD: ABNORMAL
EOSINOPHIL # BLD AUTO: 0.3 10E9/L (ref 0–0.7)
EOSINOPHIL NFR BLD AUTO: 4.4 %
ERYTHROCYTE [DISTWIDTH] IN BLOOD BY AUTOMATED COUNT: 12.8 % (ref 10–15)
HCT VFR BLD AUTO: 45.7 % (ref 40–53)
HGB BLD-MCNC: 15.1 G/DL (ref 13.3–17.7)
LYMPHOCYTES # BLD AUTO: 3 10E9/L (ref 0.8–5.3)
LYMPHOCYTES NFR BLD AUTO: 39.5 %
MCH RBC QN AUTO: 25.4 PG (ref 26.5–33)
MCHC RBC AUTO-ENTMCNC: 33 G/DL (ref 31.5–36.5)
MCV RBC AUTO: 77 FL (ref 78–100)
MONOCYTES # BLD AUTO: 0.4 10E9/L (ref 0–1.3)
MONOCYTES NFR BLD AUTO: 5.6 %
NEUTROPHILS # BLD AUTO: 3.8 10E9/L (ref 1.6–8.3)
NEUTROPHILS NFR BLD AUTO: 50.1 %
PLATELET # BLD AUTO: 253 10E9/L (ref 150–450)
PLATELET # BLD EST: ABNORMAL 10*3/UL
RBC # BLD AUTO: 5.95 10E12/L (ref 4.4–5.9)
RBC MORPH BLD: ABNORMAL
RETICS # AUTO: 53.6 10E9/L (ref 25–95)
RETICS/RBC NFR AUTO: 0.9 % (ref 0.5–2)
WBC # BLD AUTO: 7.5 10E9/L (ref 4–11)

## 2018-02-16 PROCEDURE — 83021 HEMOGLOBIN CHROMOTOGRAPHY: CPT | Mod: 90 | Performed by: FAMILY MEDICINE

## 2018-02-16 PROCEDURE — 99000 SPECIMEN HANDLING OFFICE-LAB: CPT | Performed by: FAMILY MEDICINE

## 2018-02-16 PROCEDURE — 85060 BLOOD SMEAR INTERPRETATION: CPT | Performed by: FAMILY MEDICINE

## 2018-02-16 PROCEDURE — 85045 AUTOMATED RETICULOCYTE COUNT: CPT | Performed by: FAMILY MEDICINE

## 2018-02-16 PROCEDURE — 36415 COLL VENOUS BLD VENIPUNCTURE: CPT | Performed by: FAMILY MEDICINE

## 2018-02-16 PROCEDURE — 85025 COMPLETE CBC W/AUTO DIFF WBC: CPT | Performed by: FAMILY MEDICINE

## 2018-02-18 LAB
HGB A1 MFR BLD: 97.2 % (ref 95–97.9)
HGB A2 MFR BLD: 2.6 % (ref 2–3.5)
HGB C MFR BLD: 0 % (ref 0–0)
HGB E MFR BLD: 0 % (ref 0–0)
HGB F MFR BLD: 0.2 % (ref 0–2.1)
HGB FRACT BLD ELPH-IMP: NORMAL
HGB OTHER MFR BLD: 0 % (ref 0–0)
HGB S BLD QL SOLY: NORMAL
HGB S MFR BLD: 0 % (ref 0–0)
PATH INTERP BLD-IMP: NORMAL

## 2018-02-20 LAB — LAB SCANNED RESULT: NORMAL

## 2018-02-23 DIAGNOSIS — R71.8 LOW MEAN CORPUSCULAR VOLUME (MCV): Primary | ICD-10-CM

## 2018-02-23 DIAGNOSIS — Z86.2 H/O SICKLE CELL TRAIT: ICD-10-CM

## 2018-06-05 ENCOUNTER — TELEPHONE (OUTPATIENT)
Dept: FAMILY MEDICINE | Facility: CLINIC | Age: 18
End: 2018-06-05

## 2018-06-05 NOTE — LETTER
[facility]  [address] [place] [id number] [phone]  June 12, 2018    Darwin Todd  [address] [facility] [address]    Dear Darwin,    I care about your health and have reviewed your health plan. I have reviewed your medical conditions, medication list, and lab results and am making recommendations based on this review, to better manage your health.    You are in particular need of attention regarding:  -Depression    I am recommending that you: update PHQ-9 and EVGENY-7, which are questionnaires regarding your mood over the last 2 weeks.       Here is a list of Health Maintenance topics that are due now or due soon:  Health Maintenance Due   Topic Date Due     HIV SCREEN (SYSTEM ASSIGNED)  07/20/2016     HPV IMMUNIZATION (2 of 3 - Male 3 Dose Series) 04/06/2018       Please call us at [phone] (or use "[employer] to address the above recommendations.     Thank you for trusting [facility] and we appreciate the opportunity to serve you.  We look forward to supporting your healthcare needs in the future.    Healthy Regards,          Cuba Esquivel M.D.

## 2018-06-05 NOTE — TELEPHONE ENCOUNTER
Pt is due now to update PHQ9.  Please call pt. Follow up end date 10/9/18.   PHQ-9 SCORE 2/9/2018   Total Score 16     Sarbjit GALVAN CMA

## 2018-06-12 NOTE — TELEPHONE ENCOUNTER
Attempt # 1    Called #   Telephone Information:   Mobile [phone]         Left a non detailed VM     Letter sent       Chayito Bhatia RN, BSN  [place] Triage

## 2018-08-27 ENCOUNTER — TELEPHONE (OUTPATIENT)
Dept: ENDOCRINOLOGY | Facility: CLINIC | Age: 18
End: 2018-08-27

## 2018-08-27 DIAGNOSIS — D57.3 SICKLE CELL TRAIT (H): Primary | ICD-10-CM

## 2018-08-27 NOTE — TELEPHONE ENCOUNTER
Patient has upcoming appointment with Dr Pepper in endocrinology. There is no note or referral placed. Please place referral or advised why patient is being seen in endocrine. Appointment is for this Wednesday

## 2018-08-27 NOTE — LETTER
[facility] ([facility] - [address] [id number] [phone]    August 29, 2018    Darwin Todd  [address] [facility] [address]      To Whom it May Concern:    My staff have been attempting to reach you in regards to a recent request for a referral to endocrinology.  What are you seeing endocrinology for?    Please contact my office at [phone] to discuss.     Thank you for your time.        Sincerely,        Cuba Esquivel M.D./ALECIA, RN

## 2018-08-27 NOTE — TELEPHONE ENCOUNTER
Attempt # 1 to [phone]    Left non-detailed VM for patient to call back and speak with any triage nurse.    Demographics notes to call mother, Maria Isabel, to schedule appointments but no consent on file to speak with mother.    KOBE Gaines, RN, PHN  [facility]

## 2018-08-27 NOTE — LETTER
[facility] - [address] [id number] [phone]    August 30, 2018    Darwin Todd  [address] [facility] [address]      To Whom it May Concern:    My staff have been attempting to reach you in regards to a recent request for a referral to endocrinology.  After reviewing your chart and speaking with Dr. Pepper, you should have seen hematology. Please see the attached referral information to hematology.     Please contact my office at [phone] with questions or concerns.     Thank you for your time.        Sincerely,        Cuba Esquivel M.D./ALECIA RN

## 2018-08-28 NOTE — TELEPHONE ENCOUNTER
Attempt # 2 to [phone]    Left non-detailed VM for patient to call back and speak with any triage nurse.  No consent on file to speak with mother.    Desi Stevenson, KOBE, RN, PHN  [facility]

## 2018-08-29 ENCOUNTER — OFFICE VISIT (OUTPATIENT)
Dept: ENDOCRINOLOGY | Facility: CLINIC | Age: 18
End: 2018-08-29
Payer: COMMERCIAL

## 2018-08-29 VITALS
WEIGHT: 210.4 LBS | SYSTOLIC BLOOD PRESSURE: 118 MMHG | DIASTOLIC BLOOD PRESSURE: 74 MMHG | HEART RATE: 105 BPM | RESPIRATION RATE: 20 BRPM | OXYGEN SATURATION: 98 % | HEIGHT: 67 IN | BODY MASS INDEX: 33.02 KG/M2

## 2018-08-29 DIAGNOSIS — D57.3 SICKLE CELL TRAIT (H): Primary | ICD-10-CM

## 2018-08-29 PROCEDURE — 99207 ZZC NO BILLABLE SERVICE THIS VISIT: CPT | Performed by: INTERNAL MEDICINE

## 2018-08-29 NOTE — TELEPHONE ENCOUNTER
"Attempt #3  Called # below - Left a non-detailed message to call back and speak with any triage nurse.    Letter sent    Routing to PCP for further review/recommendations/orders.  FYI - Notes for appt with endo state \"Sickle Cell\"    Ivette Mcdaniel RN  [place] Triage    "

## 2018-08-29 NOTE — NURSING NOTE
"/74 (BP Location: Right arm, Patient Position: Sitting, Cuff Size: Adult Large)  Pulse 105  Resp 20  Ht 1.702 m (5' 7\")  Wt 95.4 kg (210 lb 6.4 oz)  SpO2 98%  BMI 32.95 kg/m2  Janel Corbett, Medical Assistant    "

## 2018-08-29 NOTE — TELEPHONE ENCOUNTER
Non-detailed message left for patient to return call.  Please let patient know his referral for endocrinology has been placed.    Ivette Renee RN  [employer] [facility]

## 2018-08-29 NOTE — PROGRESS NOTES
Patient was here in clinic today.  He was not sure the reason that he is seeing endocrinology.  His understanding was he needs to be seen for sickle cell anemia.  I see there is referral for hematology and oncology placed by Dr. Esquivel.  I asked him to follow-up with hematology.  Follow-up with endocrinology if there are any concerns.  No charge.    I will forward note to .

## 2018-08-29 NOTE — MR AVS SNAPSHOT
"              After Visit Summary   8/29/2018    Darwin Todd    MRN: [id number]           Patient Information     Date Of Birth          2000        Visit Information        Provider Department      8/29/2018 2:30 PM Analilia Pepper MD [facility]        Today's Diagnoses     Sickle cell trait (H)    -  1       Follow-ups after your visit        Your next 10 appointments already scheduled     Oct 05, 2018 10:40 AM CDT   [facility] Visit with [place] [facility]ivel [facility]    [address] [place] [id number] [phone]           Bring a current list of meds and any records pertaining to this visit. For Physicals, please bring immunization records and any forms needing to be filled out. Please arrive 10 minutes early to complete paperwork.              Who to contact     If you have questions or need follow up information about today's clinic visit or your schedule please contact [facility] directly at [phone].  Normal or non-critical lab and imaging results will be communicated to you by MyChart, letter or phone within 4 business days after the clinic has received the results. If you do not hear from us within 7 days, please contact the clinic through [employer] or phone. If you have a critical or abnormal lab result, we will notify you by phone as soon as possible.  Submit refill requests through [employer] or call your pharmacy and they will forward the refill request to us. Please allow 3 business days for your refill to be completed.          Additional Information About Your Visit        Care EveryWhere ID     This is your Care EveryWhere ID. This could be used by other organizations to access your [place] medical records  [id number]        Your Vitals Were     Pulse Respirations Height Pulse Oximetry BMI (Body Mass Index)       105 20 1.702 m (5' 7\") 98% 32.95 kg/m2        Blood Pressure from Last " 3 Encounters:   08/29/18 118/74   02/09/18 122/72   12/19/17 128/83    Weight from Last 3 Encounters:   08/29/18 95.4 kg (210 lb 6.4 oz)   02/09/18 88 kg (194 lb) (90 %)*   12/19/17 85.8 kg (189 lb 2.5 oz) (88 %)*     * Growth percentiles are based on [facility] 2-20 Years data.              Today, you had the following     No orders found for display       Primary Care Provider Office Phone # Fax #    Cuba Esquivel [phone] [address] [facility] [address]        Equal Access to Services     TIA GONGORA : Hadii tamie Burnette, waemerson lópez, qaybdaniel kaalmada jhonny, jackie tsai. So [facility] [phone].    ATENCIÓN: Si habla español, tiene a everett disposición servicios gratuitos de asistencia lingüística. Llame al [phone].    We comply with applicable federal civil rights laws and [place] laws. We do not discriminate on the basis of race, color, national origin, age, disability, sex, sexual orientation, or gender identity.            Thank you!     Thank you for choosing [facility]  for your care. Our goal is always to provide you with excellent care. Hearing back from our patients is one way we can continue to improve our services. Please take a few minutes to complete the written survey that you may receive in the mail after your visit with us. Thank you!             Your Updated Medication List - Protect others around you: Learn how to safely use, store and throw away your medicines at www.disposemymeds.org.          This list is accurate as of 8/29/18  3:00 PM.  Always use your most recent med list.                   Brand Name Dispense Instructions for use Diagnosis    loratadine 10 MG tablet    CLARITIN     TK 1 T PO D        polyethylene glycol powder    MIRALAX    1530 g    Take 17 g (1 capful) by mouth daily In 8oz of water or juice    Irritable bowel syndrome with both constipation and diarrhea, Constipation,  unspecified constipation type       sertraline 50 MG tablet    ZOLOFT    90 tablet    Take 1 tablet (50 mg) by mouth daily    Encounter for routine adult health examination without abnormal findings, Mild major depression (H), Anxiety

## 2018-08-30 NOTE — TELEPHONE ENCOUNTER
Message handled by Nurse Triage with [facility] - provider name: MD TAB - patient should have seen hematology for this, not endrocrinology. MD TAB also spoke with Dr. Pepper - advising patient see hematology.    Referral was already placed for hematology on 02/23/2018  Letter sent with referral information    Ivette Mcdaniel RN  [place] Triage

## 2019-07-15 ENCOUNTER — OFFICE VISIT (OUTPATIENT)
Dept: INTERNAL MEDICINE | Facility: CLINIC | Age: 19
End: 2019-07-15
Payer: COMMERCIAL

## 2019-07-15 VITALS
OXYGEN SATURATION: 97 % | DIASTOLIC BLOOD PRESSURE: 88 MMHG | TEMPERATURE: 99 F | HEART RATE: 82 BPM | WEIGHT: 208.1 LBS | BODY MASS INDEX: 32.59 KG/M2 | RESPIRATION RATE: 16 BRPM | SYSTOLIC BLOOD PRESSURE: 128 MMHG

## 2019-07-15 DIAGNOSIS — D57.3 SICKLE CELL TRAIT (H): ICD-10-CM

## 2019-07-15 DIAGNOSIS — Z87.898 HISTORY OF SYNCOPE: ICD-10-CM

## 2019-07-15 DIAGNOSIS — E78.5 HYPERLIPIDEMIA LDL GOAL <130: ICD-10-CM

## 2019-07-15 DIAGNOSIS — Z00.00 ROUTINE GENERAL MEDICAL EXAMINATION AT A HEALTH CARE FACILITY: Primary | ICD-10-CM

## 2019-07-15 LAB
ALBUMIN SERPL-MCNC: 4.1 G/DL (ref 3.4–5)
ALP SERPL-CCNC: 118 U/L (ref 40–150)
ALT SERPL W P-5'-P-CCNC: 32 U/L (ref 0–70)
ANION GAP SERPL CALCULATED.3IONS-SCNC: 8 MMOL/L (ref 3–14)
AST SERPL W P-5'-P-CCNC: 14 U/L (ref 0–45)
BILIRUB SERPL-MCNC: 0.4 MG/DL (ref 0.2–1.3)
BUN SERPL-MCNC: 10 MG/DL (ref 7–30)
CALCIUM SERPL-MCNC: 9 MG/DL (ref 8.5–10.1)
CHLORIDE SERPL-SCNC: 107 MMOL/L (ref 94–109)
CHOLEST SERPL-MCNC: 245 MG/DL
CO2 SERPL-SCNC: 25 MMOL/L (ref 20–32)
CREAT SERPL-MCNC: 0.98 MG/DL (ref 0.66–1.25)
ERYTHROCYTE [DISTWIDTH] IN BLOOD BY AUTOMATED COUNT: 14 % (ref 10–15)
GFR SERPL CREATININE-BSD FRML MDRD: >90 ML/MIN/{1.73_M2}
GLUCOSE SERPL-MCNC: 85 MG/DL (ref 70–99)
HCT VFR BLD AUTO: 46.3 % (ref 40–53)
HDLC SERPL-MCNC: 44 MG/DL
HGB BLD-MCNC: 14.6 G/DL (ref 13.3–17.7)
LDLC SERPL CALC-MCNC: 180 MG/DL
MCH RBC QN AUTO: 25 PG (ref 26.5–33)
MCHC RBC AUTO-ENTMCNC: 31.5 G/DL (ref 31.5–36.5)
MCV RBC AUTO: 79 FL (ref 78–100)
NONHDLC SERPL-MCNC: 201 MG/DL
PLATELET # BLD AUTO: 272 10E9/L (ref 150–450)
POTASSIUM SERPL-SCNC: 4 MMOL/L (ref 3.4–5.3)
PROT SERPL-MCNC: 7.5 G/DL (ref 6.8–8.8)
RBC # BLD AUTO: 5.84 10E12/L (ref 4.4–5.9)
SODIUM SERPL-SCNC: 140 MMOL/L (ref 133–144)
TRIGL SERPL-MCNC: 103 MG/DL
TSH SERPL DL<=0.005 MIU/L-ACNC: 2.46 MU/L (ref 0.4–4)
WBC # BLD AUTO: 8.8 10E9/L (ref 4–11)

## 2019-07-15 PROCEDURE — 99395 PREV VISIT EST AGE 18-39: CPT | Performed by: INTERNAL MEDICINE

## 2019-07-15 PROCEDURE — 80053 COMPREHEN METABOLIC PANEL: CPT | Performed by: INTERNAL MEDICINE

## 2019-07-15 PROCEDURE — 36415 COLL VENOUS BLD VENIPUNCTURE: CPT | Performed by: INTERNAL MEDICINE

## 2019-07-15 PROCEDURE — 84443 ASSAY THYROID STIM HORMONE: CPT | Performed by: INTERNAL MEDICINE

## 2019-07-15 PROCEDURE — 93000 ELECTROCARDIOGRAM COMPLETE: CPT | Performed by: INTERNAL MEDICINE

## 2019-07-15 PROCEDURE — 85027 COMPLETE CBC AUTOMATED: CPT | Performed by: INTERNAL MEDICINE

## 2019-07-15 PROCEDURE — 80061 LIPID PANEL: CPT | Performed by: INTERNAL MEDICINE

## 2019-07-15 ASSESSMENT — ENCOUNTER SYMPTOMS
JOINT SWELLING: 0
NERVOUS/ANXIOUS: 0
DIARRHEA: 0
DYSURIA: 0
DIZZINESS: 1
CONSTIPATION: 1
FREQUENCY: 0
NAUSEA: 1
PALPITATIONS: 0
WEAKNESS: 0
MYALGIAS: 1
CHILLS: 0
HEMATURIA: 0
ABDOMINAL PAIN: 0
SORE THROAT: 0
COUGH: 0
FEVER: 0
HEADACHES: 0
EYE PAIN: 0
ARTHRALGIAS: 1
HEARTBURN: 0
HEMATOCHEZIA: 0
PARESTHESIAS: 0
SHORTNESS OF BREATH: 0

## 2019-07-15 ASSESSMENT — ANXIETY QUESTIONNAIRES
2. NOT BEING ABLE TO STOP OR CONTROL WORRYING: NOT AT ALL
1. FEELING NERVOUS, ANXIOUS, OR ON EDGE: NOT AT ALL
3. WORRYING TOO MUCH ABOUT DIFFERENT THINGS: NOT AT ALL
GAD7 TOTAL SCORE: 0
GAD7 TOTAL SCORE: 0
5. BEING SO RESTLESS THAT IT IS HARD TO SIT STILL: NOT AT ALL
7. FEELING AFRAID AS IF SOMETHING AWFUL MIGHT HAPPEN: NOT AT ALL
4. TROUBLE RELAXING: NOT AT ALL
7. FEELING AFRAID AS IF SOMETHING AWFUL MIGHT HAPPEN: NOT AT ALL
GAD7 TOTAL SCORE: 0
6. BECOMING EASILY ANNOYED OR IRRITABLE: NOT AT ALL

## 2019-07-15 ASSESSMENT — PATIENT HEALTH QUESTIONNAIRE - PHQ9
10. IF YOU CHECKED OFF ANY PROBLEMS, HOW DIFFICULT HAVE THESE PROBLEMS MADE IT FOR YOU TO DO YOUR WORK, TAKE CARE OF THINGS AT HOME, OR GET ALONG WITH OTHER PEOPLE: SOMEWHAT DIFFICULT
SUM OF ALL RESPONSES TO PHQ QUESTIONS 1-9: 4
SUM OF ALL RESPONSES TO PHQ QUESTIONS 1-9: 4

## 2019-07-15 NOTE — PROGRESS NOTES
Dr Elkins's note    Patient's instructions / PLAN:                                                        Plan:  1. Labs - [address]  2. Low cholesterol diet and exercise will help with the cholesterol numbers - read the prints   3. Electrocardiogram today         ASSESSMENT & PLAN:                                                      (Z00.00) Routine general medical examination at a health care facility  (primary encounter diagnosis)  Comment:   Plan: Comprehensive metabolic panel, CBC with         platelets, TSH with free T4 reflex, Lipid panel        reflex to direct LDL Fasting            (D57.3) Sickle cell trait (H)  Comment:   Plan: f/u CBC    (Z87.898) History of syncope  Comment: ECG normal today   Most likely vasovagal   Plan: EKG 12-lead complete w/read - Clinics            (E78.5) Hyperlipidemia LDL goal <130  Comment: discussed diet and exercise   Plan: Comprehensive metabolic panel, TSH with free T4        reflex, Lipid panel reflex to direct LDL         Fasting               Chief Complaint:                                                      Annual exam  Follow up chronic medical problems      SUBJECTIVE:                                                    History of present illness     We reviewed the chronic medical problems as above.   I reviewed the recent tests results in Epic     Few months ago he was up for 24h playing games and he had syncope: he was lying for 2 min and then when he stood up he felt the vision was getting black and he passed out. No witness. No tongue bites, no loss of urine or bowel, no sore muscles.     Sometimes he has lightheadedness and nausea. Not associated with activity. He admits he spends a lot of time on computer.    He has hx chronic migraines at age 11.     Lives with mother, no job, doesn't go to school     ROS:   General: Negative for fever, chills, major weight changes, fatigue  Skin: Negative for rashes, abnormal spots  Eyes: Negative for blurred or double  vision  ENT/mouth: Negative for sinuses discomfort, earache, sore throat  Respiratory: Negative for cough, wheezes, chronic lung disease  Cardiovascular: Negative for rest or exertional chest pain, shortness of breath, palpitations, leg edema,   Gastrointestinal: Negative for vomiting, abdominal pain, heartburn, blood in stool, diarrhea, constipation  Genitourinary: Negative for urinary frequency, blood in urine, history of kidney stones  Male: Negative for difficulty urinating  Neuro: Negative for headaches, numbness, tingling, weakness in arms or legs, history of seizure, recent syncope  Psychiatry: Negative for depression, anxiety, suicidal thoughts  Endo: Negative for known thyroid disease, diabetes.  Hemato/Lymph: Negative for nodes, easy bleeding, history of DVT, blood transfusion  Musculoskeletal: Negative for joint swelling, back pain      PMHx: - reviewed  Past Medical History:   Diagnosis Date     Anxiety      Environmental allergies      Hyperlipidemia LDL goal <100      Marijuana use      Mild major depression (H)      Prediabetes      Sickle cell trait (H)          PSHx: reviewed  Past Surgical History:   Procedure Laterality Date     ESOPHAGOSCOPY, GASTROSCOPY, DUODENOSCOPY (EGD), COMBINED N/A 8/24/2016    normal     NO HISTORY OF SURGERY          Soc Hx: No daily alcohol, no smoking  Social History     Socioeconomic History     Marital status: Single     Spouse name: Not on file     Number of children: 0     Years of education: Not on file     Highest education level: Not on file   Occupational History     Occupation: unloads trucks for [employer]    Social Needs     Financial resource strain: Not on file     Food insecurity:     Worry: Not on file     Inability: Not on file     Transportation needs:     Medical: Not on file     Non-medical: Not on file   Tobacco Use     Smoking status: Former Smoker     Packs/day: 0.25     Smokeless tobacco: Never Used     Tobacco comment:  quit 1/2018   Substance and  Sexual Activity     Alcohol use: No     Alcohol/week: 0.0 oz     Comment: 1-5 per year     Drug use: No     Sexual activity: Never     Partners: Female   Lifestyle     Physical activity:     Days per week: Not on file     Minutes per session: Not on file     Stress: Not on file   Relationships     Social connections:     Talks on phone: Not on file     Gets together: Not on file     Attends Restoration service: Not on file     Active member of club or organization: Not on file     Attends meetings of clubs or organizations: Not on file     Relationship status: Not on file     Intimate partner violence:     Fear of current or ex partner: Not on file     Emotionally abused: Not on file     Physically abused: Not on file     Forced sexual activity: Not on file   Other Topics Concern     Parent/sibling w/ CABG, MI or angioplasty before 65F 55M? No      Service Not Asked     Blood Transfusions Not Asked     Caffeine Concern Yes     Comment: 5 cans diet daily     Occupational Exposure Not Asked     Hobby Hazards Not Asked     Sleep Concern Not Asked     Stress Concern Not Asked     Weight Concern Not Asked     Special Diet Not Asked     Back Care Not Asked     Exercise Yes     Comment: starting     Bike Helmet Not Asked     Seat Belt Yes     Self-Exams Not Asked   Social History Narrative     Not on file        Fam Hx: reviewed  Family History   Problem Relation Age of Onset     Pancreatitis Father      Diabetes Father      Hyperlipidemia Father      Substance Abuse Father      Diabetes Mother      Hyperlipidemia Mother      Liver Disease Mother         fatty     Diabetes Brother         pre-diabetes     Cancer Paternal Grandmother 68        stomach     Diabetes Paternal Grandmother      Glaucoma Maternal Grandmother      Hypertension Maternal Grandmother      Hyperlipidemia Maternal Grandmother      Suicide Maternal Grandfather          Screening: reviewed    All: reviewed    Meds: reviewed  No current outpatient  medications on file.           OBJECTIVE:                                                    Physical Exam :      Blood pressure 128/88, pulse 82, temperature 99  F (37.2  C), temperature source Oral, resp. rate 16, weight 94.4 kg (208 lb 1.6 oz), SpO2 97 %.   NAD, appears comfortable  Skin clear, no rashes  HEENT: PERRLA, EOMI, anicteric sclera, pink conjunctiva, external ears appear normal, bilateral tympanic membranes clinically normal, oropharynx normal color.  Neck: supple, no JVD,  no thyroidmegaly  Lymph nodes non palpable in the cervical, supraclavicular axillaries,   Chest: clear to auscultation with good respiratory effort  Cardiac: S1S2, RRR, no mgr appreciated  Abdomen: soft, not tender, not distended, audible bowel sound, no hepatosplenomegaly, no palpable masses, no abdominal bruits  Extremities: no cyanosis, clubbing or edema.   Neuro: A, Ox3, no focal signs.  Breast exam no gynecomastia, no masses  Genital exam refused       Suzanne Elkins MD  Internal Medicine     SUBJECTIVE:   CC: Darwin Todd is an 18 year old male who presents for preventative health visit.     Healthy Habits:     Getting at least 3 servings of Calcium per day:  NO    Bi-annual eye exam:  Yes    Dental care twice a year:  NO    Sleep apnea or symptoms of sleep apnea:  None    Diet:  Regular (no restrictions)    Frequency of exercise:  None    Taking medications regularly:  Not Applicable    Medication side effects:  Not applicable    PHQ-2 Total Score: 0    Additional concerns today:  No              Today's PHQ-2 Score:   PHQ-2 ( 1999 Pfizer) 7/15/2019   Q1: Little interest or pleasure in doing things 0   Q2: Feeling down, depressed or hopeless 0   PHQ-2 Score 0   Q1: Little interest or pleasure in doing things Not at all   Q2: Feeling down, depressed or hopeless Not at all   PHQ-2 Score 0       Abuse: Current or Past(Physical, Sexual or Emotional)- No  Do you feel safe in your environment? Yes    Social History     Tobacco  "Use     Smoking status: Former Smoker     Packs/day: 0.25     Smokeless tobacco: Never Used     Tobacco comment:  quit 1/2018   Substance Use Topics     Alcohol use: No     Alcohol/week: 0.0 oz     Comment: 1-5 per year         Alcohol Use 7/15/2019   Prescreen: >3 drinks/day or >7 drinks/week? Not Applicable       Last PSA: No results found for: PSA    Reviewed orders with patient. Reviewed health maintenance and updated orders accordingly - Yes  Labs reviewed in EPIC    Reviewed and updated as needed this visit by clinical staff         Reviewed and updated as needed this visit by Provider            Review of Systems   Constitutional: Negative for chills and fever.   HENT: Positive for congestion. Negative for ear pain, hearing loss and sore throat.    Eyes: Negative for pain and visual disturbance.   Respiratory: Negative for cough and shortness of breath.    Cardiovascular: Negative for chest pain, palpitations and peripheral edema.   Gastrointestinal: Positive for constipation and nausea. Negative for abdominal pain, diarrhea, heartburn and hematochezia.   Genitourinary: Negative for discharge, dysuria, frequency, genital sores, hematuria, impotence and urgency.   Musculoskeletal: Positive for arthralgias and myalgias. Negative for joint swelling.   Skin: Negative for rash.   Neurological: Positive for dizziness. Negative for weakness, headaches and paresthesias.   Psychiatric/Behavioral: Negative for mood changes. The patient is not nervous/anxious.            COUNSELING:   Reviewed preventive health counseling, as reflected in patient instructions       Regular exercise       Healthy diet/nutrition    Estimated body mass index is 32.95 kg/m  as calculated from the following:    Height as of 8/29/18: 1.702 m (5' 7\").    Weight as of 8/29/18: 95.4 kg (210 lb 6.4 oz).     Weight management plan: Discussed healthy diet and exercise guidelines     reports that he has quit smoking. He smoked 0.25 packs per day. He " has never used smokeless tobacco.      Counseling Resources:  ATP IV Guidelines  Pooled Cohorts Equation Calculator  FRAX Risk Assessment  ICSI Preventive Guidelines  Dietary Guidelines for Americans, 2010  USDA's MyPlate  ASA Prophylaxis  Lung CA Screening    Suzanne Dobbs MD  [facility]  Answers for HPI/ROS submitted by the patient on 7/15/2019   Annual Exam:  If you checked off any problems, how difficult have these problems made it for you to do your work, take care of things at home, or get along with other people?: Somewhat difficult  PHQ9 TOTAL SCORE: 4  EVGENY 7 TOTAL SCORE: 0

## 2019-07-15 NOTE — LETTER
[facility]  [address], [address]  [place] [address]  [phone]        July 21, 2019    Darwin Todd  [address] [place] [address]            Dear Mr. Darwin Todd:    These are the recent blood test results.  They are in acceptable range but high cholesterol.  At your last office visit we talked about diet and exercise and I gave you some printed information about low-cholesterol diet.    I will enclose some printed information about exercise as well.    Sincerely,    Suzanne Elkins MD  Internal Medicine     These are some general explanations for tests  WBC means White Blood Cells  Platelets are small blood cells that help with forming the blood clots along with other blood factors.  Electrolytes are Sodium, Potassium, Calcium, Magnesium, Phosphorus.  Liver tests are: AST, ALT, Bilirubin, Alkaline Phosphatase.  Kidney tests are Creatinine, GFR.  HDL Cholesterol - is the good cholesterol and it is good to have it high.  LDL cholesterol is the bad cholesterol and it is good to have it low.  It is recommended to have LDL less than 130 for people with hypertension and to have it less than 100 for people with heart disease, diabetes and chronic kidney disease.  Thyroid tests are TSH, T4, T3  A1c is a test that gives us an idea about how well was controlled the diabetes for the last 3 months.

## 2019-07-15 NOTE — NURSING NOTE
"Vital signs:  Temp: 99  F (37.2  C) Temp src: Oral BP: 128/88 Pulse: 82   Resp: 16 SpO2: 97 %       Weight: 94.4 kg (208 lb 1.6 oz)  Estimated body mass index is 32.59 kg/m  as calculated from the following:    Height as of 8/29/18: 1.702 m (5' 7\").    Weight as of this encounter: 94.4 kg (208 lb 1.6 oz).          "

## 2019-07-15 NOTE — PATIENT INSTRUCTIONS
Plan:  1. Labs - [address]  2. Low cholesterol diet and exercise will help with the cholesterol numbers - read the prints   3. Electrocardiogram today

## 2019-07-16 ASSESSMENT — ANXIETY QUESTIONNAIRES: GAD7 TOTAL SCORE: 0

## 2019-07-16 ASSESSMENT — PATIENT HEALTH QUESTIONNAIRE - PHQ9: SUM OF ALL RESPONSES TO PHQ QUESTIONS 1-9: 4

## 2021-10-01 NOTE — PROGRESS NOTES
Pre-Visit Planning   Next 5 appointments (look out 90 days)    Oct 04, 2021 10:50 AM  (Arrive by 10:25 AM)  Adult Preventative Visit with Jonah Leroy MD  [facility] ([facility] ) [address] [facility] [address] [phone]        Appointment Notes for this encounter:   Reviewed -zg  Yearly Physical    Questionnaires Reviewed/Assigned  No additional questionnaires are needed       Patient preferred phone number: [phone]    Unable to reach. Left voicemail. Advised patient to call clinic back at [phone].

## 2021-10-04 ENCOUNTER — OFFICE VISIT (OUTPATIENT)
Dept: FAMILY MEDICINE | Facility: CLINIC | Age: 21
End: 2021-10-04
Payer: COMMERCIAL

## 2021-10-04 VITALS
WEIGHT: 251.6 LBS | SYSTOLIC BLOOD PRESSURE: 120 MMHG | TEMPERATURE: 98.6 F | RESPIRATION RATE: 18 BRPM | HEIGHT: 67 IN | DIASTOLIC BLOOD PRESSURE: 76 MMHG | HEART RATE: 65 BPM | BODY MASS INDEX: 39.49 KG/M2

## 2021-10-04 DIAGNOSIS — Z23 NEED FOR PROPHYLACTIC VACCINATION AND INOCULATION AGAINST INFLUENZA: ICD-10-CM

## 2021-10-04 DIAGNOSIS — F32.0 MILD MAJOR DEPRESSION (H): ICD-10-CM

## 2021-10-04 DIAGNOSIS — Z23 NEED FOR TDAP VACCINATION: ICD-10-CM

## 2021-10-04 DIAGNOSIS — I73.00 RAYNAUD'S DISEASE WITHOUT GANGRENE: ICD-10-CM

## 2021-10-04 DIAGNOSIS — R73.03 PREDIABETES: ICD-10-CM

## 2021-10-04 DIAGNOSIS — K59.01 SLOW TRANSIT CONSTIPATION: ICD-10-CM

## 2021-10-04 DIAGNOSIS — Z83.3 FAMILY HISTORY OF DIABETES MELLITUS: ICD-10-CM

## 2021-10-04 DIAGNOSIS — L65.9 ALOPECIA: ICD-10-CM

## 2021-10-04 DIAGNOSIS — D57.3 SICKLE CELL TRAIT (H): ICD-10-CM

## 2021-10-04 DIAGNOSIS — Z00.00 ANNUAL PHYSICAL EXAM: Primary | ICD-10-CM

## 2021-10-04 DIAGNOSIS — E66.01 MORBID OBESITY (H): ICD-10-CM

## 2021-10-04 LAB
ALBUMIN SERPL-MCNC: 4.1 G/DL (ref 3.4–5)
ALP SERPL-CCNC: 135 U/L (ref 40–150)
ALT SERPL W P-5'-P-CCNC: 50 U/L (ref 0–70)
ANION GAP SERPL CALCULATED.3IONS-SCNC: 8 MMOL/L (ref 3–14)
AST SERPL W P-5'-P-CCNC: 28 U/L (ref 0–45)
BASOPHILS # BLD AUTO: 0 10E3/UL (ref 0–0.2)
BASOPHILS NFR BLD AUTO: 1 %
BILIRUB SERPL-MCNC: 0.3 MG/DL (ref 0.2–1.3)
BUN SERPL-MCNC: 10 MG/DL (ref 7–30)
CALCIUM SERPL-MCNC: 9.5 MG/DL (ref 8.5–10.1)
CHLORIDE BLD-SCNC: 108 MMOL/L (ref 94–109)
CHOLEST SERPL-MCNC: 243 MG/DL
CO2 SERPL-SCNC: 22 MMOL/L (ref 20–32)
CREAT SERPL-MCNC: 0.91 MG/DL (ref 0.66–1.25)
EOSINOPHIL # BLD AUTO: 0.2 10E3/UL (ref 0–0.7)
EOSINOPHIL NFR BLD AUTO: 3 %
ERYTHROCYTE [DISTWIDTH] IN BLOOD BY AUTOMATED COUNT: 14.5 % (ref 10–15)
FASTING STATUS PATIENT QL REPORTED: YES
GFR SERPL CREATININE-BSD FRML MDRD: >90 ML/MIN/1.73M2
GLUCOSE BLD-MCNC: 92 MG/DL (ref 70–99)
HBA1C MFR BLD: 5.7 % (ref 0–5.6)
HCT VFR BLD AUTO: 44.9 % (ref 40–53)
HDLC SERPL-MCNC: 40 MG/DL
HGB BLD-MCNC: 14.3 G/DL (ref 13.3–17.7)
LDLC SERPL CALC-MCNC: 184 MG/DL
LYMPHOCYTES # BLD AUTO: 1.7 10E3/UL (ref 0.8–5.3)
LYMPHOCYTES NFR BLD AUTO: 29 %
MCH RBC QN AUTO: 24.6 PG (ref 26.5–33)
MCHC RBC AUTO-ENTMCNC: 31.8 G/DL (ref 31.5–36.5)
MCV RBC AUTO: 77 FL (ref 78–100)
MONOCYTES # BLD AUTO: 0.4 10E3/UL (ref 0–1.3)
MONOCYTES NFR BLD AUTO: 7 %
NEUTROPHILS # BLD AUTO: 3.6 10E3/UL (ref 1.6–8.3)
NEUTROPHILS NFR BLD AUTO: 61 %
NONHDLC SERPL-MCNC: 203 MG/DL
PLATELET # BLD AUTO: 308 10E3/UL (ref 150–450)
POTASSIUM BLD-SCNC: 4.1 MMOL/L (ref 3.4–5.3)
PROT SERPL-MCNC: 7.6 G/DL (ref 6.8–8.8)
RBC # BLD AUTO: 5.81 10E6/UL (ref 4.4–5.9)
SODIUM SERPL-SCNC: 138 MMOL/L (ref 133–144)
TRIGL SERPL-MCNC: 94 MG/DL
TSH SERPL DL<=0.005 MIU/L-ACNC: 1.96 MU/L (ref 0.4–4)
WBC # BLD AUTO: 5.9 10E3/UL (ref 4–11)

## 2021-10-04 PROCEDURE — 83036 HEMOGLOBIN GLYCOSYLATED A1C: CPT | Performed by: FAMILY MEDICINE

## 2021-10-04 PROCEDURE — 90715 TDAP VACCINE 7 YRS/> IM: CPT | Performed by: FAMILY MEDICINE

## 2021-10-04 PROCEDURE — 99214 OFFICE O/P EST MOD 30 MIN: CPT | Mod: 25 | Performed by: FAMILY MEDICINE

## 2021-10-04 PROCEDURE — 96127 BRIEF EMOTIONAL/BEHAV ASSMT: CPT | Performed by: FAMILY MEDICINE

## 2021-10-04 PROCEDURE — 90472 IMMUNIZATION ADMIN EACH ADD: CPT | Performed by: FAMILY MEDICINE

## 2021-10-04 PROCEDURE — 36415 COLL VENOUS BLD VENIPUNCTURE: CPT | Performed by: FAMILY MEDICINE

## 2021-10-04 PROCEDURE — 80061 LIPID PANEL: CPT | Performed by: FAMILY MEDICINE

## 2021-10-04 PROCEDURE — 90686 IIV4 VACC NO PRSV 0.5 ML IM: CPT | Performed by: FAMILY MEDICINE

## 2021-10-04 PROCEDURE — 99207 PR FOOT EXAM NO CHARGE: CPT | Mod: 25 | Performed by: FAMILY MEDICINE

## 2021-10-04 PROCEDURE — 99395 PREV VISIT EST AGE 18-39: CPT | Mod: 25 | Performed by: FAMILY MEDICINE

## 2021-10-04 PROCEDURE — 80050 GENERAL HEALTH PANEL: CPT | Performed by: FAMILY MEDICINE

## 2021-10-04 PROCEDURE — 90471 IMMUNIZATION ADMIN: CPT | Performed by: FAMILY MEDICINE

## 2021-10-04 RX ORDER — POLYETHYLENE GLYCOL 3350 17 G/17G
1 POWDER, FOR SOLUTION ORAL DAILY
Qty: 507 G | Refills: 5 | Status: SHIPPED | OUTPATIENT
Start: 2021-10-04 | End: 2023-03-30

## 2021-10-04 ASSESSMENT — ENCOUNTER SYMPTOMS
NERVOUS/ANXIOUS: 0
CHILLS: 0
COUGH: 0
ARTHRALGIAS: 0
DYSURIA: 0
CONSTIPATION: 1
FREQUENCY: 0
EYE PAIN: 0
HEADACHES: 0
WEAKNESS: 0
ABDOMINAL PAIN: 1
DIARRHEA: 1
MYALGIAS: 0
PARESTHESIAS: 0
JOINT SWELLING: 0
HEMATOCHEZIA: 0
HEARTBURN: 0
HEMATURIA: 0
DIZZINESS: 0
PALPITATIONS: 0
NAUSEA: 0
SORE THROAT: 0
SHORTNESS OF BREATH: 0
FEVER: 0

## 2021-10-04 ASSESSMENT — ANXIETY QUESTIONNAIRES
4. TROUBLE RELAXING: NOT AT ALL
GAD7 TOTAL SCORE: 2
6. BECOMING EASILY ANNOYED OR IRRITABLE: NOT AT ALL
GAD7 TOTAL SCORE: 2
7. FEELING AFRAID AS IF SOMETHING AWFUL MIGHT HAPPEN: NOT AT ALL
2. NOT BEING ABLE TO STOP OR CONTROL WORRYING: SEVERAL DAYS
GAD7 TOTAL SCORE: 2
8. IF YOU CHECKED OFF ANY PROBLEMS, HOW DIFFICULT HAVE THESE MADE IT FOR YOU TO DO YOUR WORK, TAKE CARE OF THINGS AT HOME, OR GET ALONG WITH OTHER PEOPLE?: NOT DIFFICULT AT ALL
1. FEELING NERVOUS, ANXIOUS, OR ON EDGE: NOT AT ALL
5. BEING SO RESTLESS THAT IT IS HARD TO SIT STILL: NOT AT ALL
7. FEELING AFRAID AS IF SOMETHING AWFUL MIGHT HAPPEN: NOT AT ALL
3. WORRYING TOO MUCH ABOUT DIFFERENT THINGS: SEVERAL DAYS

## 2021-10-04 ASSESSMENT — MIFFLIN-ST. JEOR: SCORE: 2094.88

## 2021-10-04 ASSESSMENT — PATIENT HEALTH QUESTIONNAIRE - PHQ9
10. IF YOU CHECKED OFF ANY PROBLEMS, HOW DIFFICULT HAVE THESE PROBLEMS MADE IT FOR YOU TO DO YOUR WORK, TAKE CARE OF THINGS AT HOME, OR GET ALONG WITH OTHER PEOPLE: NOT DIFFICULT AT ALL
SUM OF ALL RESPONSES TO PHQ QUESTIONS 1-9: 0
SUM OF ALL RESPONSES TO PHQ QUESTIONS 1-9: 0

## 2021-10-04 NOTE — PATIENT INSTRUCTIONS
Patient Education     Raynaud Disease  Your healthcare provider has told you that you have Raynaud disease. It is also called Raynaud phenomenon or Raynaud syndrome. There is no cure for Raynaud disease, but you can manage it to help prevent attacks.    What are the symptoms of Raynaud disease?  A Raynaud disease attack is often triggered by cold or stress. During an attack, blood vessels suddenly narrow (called vasospasm).  This most often happens in fingers and toes. In rare cases, the nose, ears, nipples, or even tongue are affected. Narrowed blood vessels reduce the blood supply to the area. The area then turns white, blue, or red. The area may feel numb or painful. As the attack passes, the blood vessels open. The affected area may turn bright red as it warms up, then returns to normal color.  What is the cause of Raynaud disease?  With Raynaud disease, it is believed that blood vessels in the affected areas overrespond to certain triggers, such as cold. This makes them narrow (called vasospasm) much more than in people without the disease. Experts don t know what causes the blood vessels to react so strongly to certain triggers. In between attacks, the blood vessels are normal and healthy. Attacks don t permanently damage the blood vessels. But may thicken the artery walls.   In some cases, Raynaud disease happens along with another disease or condition. This is often a connective tissue disorder, such as lupus, scleroderma, or rheumatoid arthritis. This is called secondary Raynaud disease (as opposed to primary Raynaud disease discussed above) and may be more severe. If this is the case for you, you and your healthcare provider can discuss treatment for the underlying condition.  What are the risk factors?  Risk factors for Raynaud disease include:    Women are more likely to get Raynaud disease than men.    Younger people are at higher risk, usually ages 15 to 30.    Living in colder climates increases  risk.    Having a family member with Raynaud disease increases your risk.    Underlying rheumatoid conditions may increase your risk.   What are possible triggers?  Triggers for Raynaud disease include:     Cold    Stress    Caffeine    Smoking    Repetitive movements    Certain medicines, such as beta-blockers, migraine medicine, birth control pills and others    Injury  How is Raynaud disease diagnosed?  Your description of your symptoms, a health history, and a physical exam are often enough for a diagnosis. Blood tests and other tests may be done to see if any underlying conditions are present and rule out other problems.  How is Raynaud disease treated?  There is no cure for Raynaud disease. But you can control symptoms and reduce the number and severity of attacks. For most people, avoiding triggers is enough to limit attacks. Your healthcare provider may suggest the following:    Take precautions to help prevent your hands and feet from losing circulation. This includes:  ? Dressing warmly in cold weather.  ? Wearing gloves or mittens when your hands may become cold, such as when you use the refrigerator or freezer.  ? Avoiding stress and caffeine.  ? Exercising regularly. This may reduce the number and severity of attacks.   ? If you smoke, quitting may improve the condition. This is because smoking causes your blood vessels to narrow and reduces blood flow.    Soak your hands or feet in warm (not hot) water. Do this at the first sign of an attack. Keep soaking until your skin color returns to normal.  In some people, symptoms are persistent or troubling. For these cases, other treatments are a choice. Your healthcare provider can tell you more about the following:    Prescription medicines. Some medicines that relax and widen blood vessels, such as calcium channel blockers. These may help relieve symptoms.    Nerve surgery. This is used for severe cases that don t respond to other treatments. Surgery  removes the nerves that surround the blood vessels in the hands and feet. Without nerve stimulation, the blood vessels stay more relaxed. They are less likely to become very narrow due to stimuli. Nerves may be blocked using injections in some cases.  Most cases of Raynaud disease are not cause for concern. The disease doesn t get worse and isn t likely to cause any permanent damage. If attacks are severe, last for a long time, or occur very often, skin damage may result. Controlling attacks can help prevent this.  When to seek medical care  The following problems happen rarely, but they can be serious. Call your healthcare provider right away if you notice any of the following:    Infection or sores on the skin    A finger or toe turns black    The skin breaks open on its own    A rash develops    A finger or toe joint becomes painful or swollen  [employer] last reviewed this educational content on 6/1/2018 2000-2021 [employer]. All rights reserved. This information is not intended as a substitute for professional medical care. Always follow your healthcare professional's instructions.    Patient Education     Constipation (Adult)  Constipation means that you have bowel movements that are less frequent than usual. Stools often become very hard and difficult to pass.  Constipation is very common. At some point in life, it affects almost everyone. Since everyone's bowel habits are different, what is constipation to one person may not be to another. Your healthcare provider may do tests to diagnose constipation. It depends on what he or she finds when evaluating you.    Symptoms of constipation include:    Abdominal pain    Bloating    Vomiting    Painful bowel movements    Itching, swelling, bleeding, or pain around the anus  Causes  Constipation can have many causes. These include:    Diet low in fiber    Too much dairy    Not drinking enough liquids    Lack of exercise or physical activity  (especially true for older adults)    Changes in lifestyle or daily routine, including pregnancy, aging, work, and travel    Frequent use or misuse of laxatives    Ignoring the urge to have a bowel movement or delaying it until later    Medicines, such as certain prescription pain medicines, iron supplements, antacids, certain antidepressants, and calcium supplements    Diseases like irritable bowel syndrome, bowel obstructions, stroke, diabetes, thyroid disease, Parkinson disease, hemorrhoids, and colon cancer  Complications  Potential complications of constipation can include:    Hemorrhoids    Rectal bleeding from hemorrhoids or anal fissures (skin tears)    Hernias    Dependency on laxatives    Chronic constipation    Fecal impaction, a severe form of constipation in which a large amount of hard stool is in your rectum that you can't pass    Bowel obstruction or perforation  Home care  All treatment should be done after talking with your healthcare provider. This is especially true if you have another medical problems, are taking prescription medicines, or are an older adult. Treatment most often involves lifestyle changes. You may also need medicines. Your healthcare provider will tell you which will work best for you. Follow the advice below to help avoid this problem in the future.  Lifestyle changes  These lifestyle changes can help prevent constipation:    Diet. Eat a high-fiber diet, with fresh fruit and vegetables, and reduce dairy intake, meats, and processed foods    Fluids. It's important to get enough fluids each day. Drink plenty of water when you eat more fiber. If you are on diet that limits the amount of fluid you can have, talk about this with your healthcare provider.    Regular exercise. Check with your healthcare provider first.  Medicines  Take any medicines as directed. Some laxatives are safe to use only every now and then. Others can be taken on a regular basis. While laxatives don't cause  bowel dependence, they are treating the symptoms. So your constipation may return if you don't make other changes. Talk with your healthcare provider or pharmacist if you have questions.  Prescription pain medicines can cause constipation. If you are taking this kind of medicine, ask your healthcare provider if you should also take a stool softener.  Medicines you may take to treat constipation include:    Fiber supplements    Stool softeners    Laxatives    Enemas    Rectal suppositories  Follow-up care  Follow up with your healthcare provider if symptoms don't get better in the next few days. You may need to have more tests or see a specialist.  Call 911  Call 911 if any of these occur:    Trouble breathing    Stiff, rigid abdomen that is severely painful to touch    Confusion    Fainting or loss of consciousness    Rapid heart rate    Chest pain  When to seek medical advice  Call your healthcare provider right away if any of these occur:    Fever of 100.4 F (38 C) or higher, or as directed by your healthcare provider    Failure to resume normal bowel movements    Pain in your abdomen or back gets worse    Nausea or vomiting    Swelling in your abdomen    Blood in the stool    Black, tarry stool    Involuntary weight loss    Weakness  [email or web address] last reviewed this educational content on 6/1/2018 2000-2021 [employer]. All rights reserved. This information is not intended as a substitute for professional medical care. Always follow your healthcare professional's instructions.

## 2021-10-04 NOTE — PROGRESS NOTES
SUBJECTIVE:   CC: Darwin Todd is an 21 year old male who presents for preventative health visit.       Patient has been advised of split billing requirements and indicates understanding: Yes  Healthy Habits:     Getting at least 3 servings of Calcium per day:  Yes    Bi-annual eye exam:  Yes    Dental care twice a year:  NO    Sleep apnea or symptoms of sleep apnea:  None    Diet:  Regular (no restrictions)    Frequency of exercise:  6-7 days/week    Duration of exercise:  30-45 minutes    Taking medications regularly:  No    Medication side effects:  None    PHQ-2 Total Score: 0    Additional concerns today:  Yes    Additional concerns as per below.     Requesting to establish care today.    He is noticed that his fingers and toes change different colors are very cold.  No associated numbness or tingling.  Family history of thyroid disorder, diabetes and mental health.    Hair loss getting worse over the years.    History of sickle cell trait, no fatigue or palpitations.    Also has constipation, has a bowel movement every 5 days, refractory to daily Metamucil.  Occasionally feels a lump in his lower abdomen that improves after defecation.  No bloody stools.    Mom is concerned about his social anxiety, stays at home and in his room all day.  Kendall he likes to play video games all day, currently plays [employer]UV[employer], however he is getting out to exercise, has been walking 1 mile daily which has been beneficial for his weight.  Previously was exercising at the gym however change during the pandemic.    Mom is requesting for his cholesterol levels to be checked.    Today's PHQ-2 Score:   PHQ-2 ( 1999 Pfizer) 10/4/2021   Q1: Little interest or pleasure in doing things 0   Q2: Feeling down, depressed or hopeless 0   PHQ-2 Score 0   Q1: Little interest or pleasure in doing things Not at all   Q2: Feeling down, depressed or hopeless Not at all   PHQ-2 Score 0       Abuse: Current or Past(Physical, Sexual or Emotional)-  No  Do you feel safe in your environment? Yes    Have you ever done Advance Care Planning? (For example, a Health Directive, POLST, or a discussion with a medical provider or your loved ones about your wishes): No, advance care planning information given to patient to review.  Patient declined advance care planning discussion at this time.    Social History     Tobacco Use     Smoking status: Former Smoker     Packs/day: 0.25     Smokeless tobacco: Never Used     Tobacco comment:  quit 1/2018   Substance Use Topics     Alcohol use: No     Alcohol/week: 0.0 standard drinks     Comment: 1-5 per year     If you drink alcohol do you typically have >3 drinks per day or >7 drinks per week? Not applicable    Alcohol Use 10/4/2021   Prescreen: >3 drinks/day or >7 drinks/week? Yes   Prescreen: >3 drinks/day or >7 drinks/week? -   AUDIT SCORE  3       Last PSA: No results found for: PSA    Reviewed orders with patient. Reviewed health maintenance and updated orders accordingly - Yes  Lab work is in process  Labs reviewed in EPIC    Reviewed and updated as needed this visit by clinical staff  Tobacco  Allergies    Med Hx  Surg Hx  Fam Hx  Soc Hx        Reviewed and updated as needed this visit by Provider                Past Medical History:   Diagnosis Date     Anxiety      Environmental allergies      Hyperlipidemia LDL goal <100      Marijuana use      Mild major depression (H)      Prediabetes      Sickle cell trait (H)       Past Surgical History:   Procedure Laterality Date     ESOPHAGOSCOPY, GASTROSCOPY, DUODENOSCOPY (EGD), COMBINED N/A 8/24/2016    normal     NO HISTORY OF SURGERY         Review of Systems   Constitutional: Negative for chills and fever.   HENT: Positive for congestion. Negative for ear pain, hearing loss and sore throat.    Eyes: Negative for pain and visual disturbance.   Respiratory: Negative for cough and shortness of breath.    Cardiovascular: Negative for chest pain, palpitations and  "peripheral edema.   Gastrointestinal: Positive for abdominal pain, constipation and diarrhea. Negative for heartburn, hematochezia and nausea.   Genitourinary: Negative for discharge, dysuria, frequency, genital sores, hematuria, impotence and urgency.   Musculoskeletal: Negative for arthralgias, joint swelling and myalgias.   Skin: Negative for rash.   Neurological: Negative for dizziness, weakness, headaches and paresthesias.   Psychiatric/Behavioral: Negative for mood changes. The patient is not nervous/anxious.          OBJECTIVE:   /76 (BP Location: Right arm, Patient Position: Sitting, Cuff Size: Adult Regular)   Pulse 65   Temp 98.6  F (37  C) (Oral)   Resp 18   Ht 1.702 m (5' 7\")   Wt 114.1 kg (251 lb 9.6 oz)   BMI 39.41 kg/m      Physical Exam  Vitals reviewed.   Constitutional:       General: He is not in acute distress.     Appearance: Normal appearance. He is not ill-appearing.   HENT:      Head: Normocephalic and atraumatic.      Comments: Moderate male pattern baldness     Right Ear: External ear normal.      Left Ear: External ear normal.      Nose: Nose normal.      Mouth/Throat:      Mouth: Mucous membranes are moist.      Pharynx: Oropharynx is clear.   Eyes:      General: No scleral icterus.        Right eye: No discharge.         Left eye: No discharge.      Extraocular Movements: Extraocular movements intact.      Pupils: Pupils are equal, round, and reactive to light.   Neck:      Vascular: No JVD.      Comments: No thyromegaly  Cardiovascular:      Rate and Rhythm: Normal rate and regular rhythm.   Pulmonary:      Effort: Pulmonary effort is normal. No respiratory distress.      Breath sounds: Normal breath sounds.   Abdominal:      General: Abdomen is flat. Bowel sounds are normal.      Palpations: Abdomen is soft.      Comments: Abdominal striae   Musculoskeletal:         General: No swelling.      Cervical back: Normal range of motion.   Lymphadenopathy:      Cervical: No " cervical adenopathy.   Skin:     General: Skin is warm.      Capillary Refill: Capillary refill takes less than 2 seconds. Bilateral radial pulses are symmetric with brisk upstroke.  Bilateral dorsalis pedis posterior normal.  His distal fingers and toes are quite cold however there is no color change.  Neurological:      General: No focal deficit present.      Mental Status: He is alert.   Psychiatric:         Mood and Affect: Mood normal.         Behavior: Behavior normal.           Diagnostic Test Results:  Labs reviewed in Epic    ASSESSMENT/PLAN:   Darwin was seen today for physical and establish care.    Diagnoses and all orders for this visit:    Annual physical exam  Updated PCP as per family's request  -     Lipid panel reflex to direct LDL Fasting; Future  -     CBC with platelets and differential; Future  -     Comprehensive metabolic panel (BMP + Alb, Alk Phos, ALT, AST, Total. Bili, TP); Future  -     Lipid panel reflex to direct LDL Fasting  -     CBC with platelets and differential  -     Comprehensive metabolic panel (BMP + Alb, Alk Phos, ALT, AST, Total. Bili, TP)    Raynaud's disease without gangrene  New diagnosis.  Has classic findings to support Raynaud's disease without gangrene.  Recommend screening for diabetes and thyroid disorder.  We discussed thermal protection, patient declined trialing oral calcium channel blockers or topical vasodilators.  Continue to monitor.  -     TSH with free T4 reflex; Future  -     TSH with free T4 reflex    Morbid obesity (H)  Exercise counseling performed in clinic.    Mild major depression (H)  Currently in remission.  Continue to monitor.    Family history of diabetes mellitus  -     Hemoglobin A1c; Future  -     Hemoglobin A1c    Sickle cell trait (H)  Asymptomatic.  He has microcytic anemia.  Continue to monitor with annual CBCs.    Alopecia  Patient has some pretty significant alopecia for only being 21 which has been quite detrimental to his mental  "health.  Will screen for thyroid disorder and diabetes as per above, recommend he has a dermatology evaluation for consideration of either topical minoxidil therapy or intrascalp prednisone therapy.  -     Adult Dermatology Referral; Future    Slow transit constipation  -     polyethylene glycol (MIRALAX) 17 GM/Dose powder; Take 17 g (1 capful) by mouth daily    Need for Tdap vaccination  -     TDAP VACCINE (Adacel, Boostrix)  [[id number]]    Other orders  -     REVIEW OF HEALTH MAINTENANCE PROTOCOL ORDERS    Addendum; prediabetes; start 500mg metformin with dinner.    Patient has been advised of split billing requirements and indicates understanding: Yes  COUNSELING:   Reviewed preventive health counseling, as reflected in patient instructions       Regular exercise       Healthy diet/nutrition       Immunizations    Vaccinated for: Influenza and TDAP          Estimated body mass index is 39.41 kg/m  as calculated from the following:    Height as of this encounter: 1.702 m (5' 7\").    Weight as of this encounter: 114.1 kg (251 lb 9.6 oz).     Weight management plan: Discussed healthy diet and exercise guidelines    He reports that he has quit smoking. He smoked 0.25 packs per day. He has never used smokeless tobacco.      Counseling Resources:  ATP IV Guidelines  Pooled Cohorts Equation Calculator  FRAX Risk Assessment  ICSI Preventive Guidelines  Dietary Guidelines for Americans, 2010  USDA's MyPlate  ASA Prophylaxis  Lung CA Screening    Jonah Leroy MD  [facility]  Answers for HPI/ROS submitted by the patient on 10/4/2021  If you checked off any problems, how difficult have these problems made it for you to do your work, take care of things at home, or get along with other people?: Not difficult at all  PHQ9 TOTAL SCORE: 0  EVGENY 7 TOTAL SCORE: 2      "

## 2021-10-04 NOTE — LETTER
October 5, 2021      Darwin Todd  [address] [facility] [id number]        Dear ,    We are writing to inform you of your test results.    Your test results fall within the expected range(s) or remain unchanged from previous results.  Please continue with current treatment plan.    Resulted Orders   Lipid panel reflex to direct LDL Fasting   Result Value Ref Range    Cholesterol 243 (H) <200 mg/dL    Triglycerides 94 <150 mg/dL    Direct Measure HDL 40 >=40 mg/dL    LDL Cholesterol Calculated 184 (H) <=100 mg/dL    Non HDL Cholesterol 203 (H) <130 mg/dL    Patient Fasting > 8hrs? Yes     Narrative    Cholesterol  Desirable:  <200 mg/dL    Triglycerides  Normal:  Less than 150 mg/dL  Borderline High:  150-199 mg/dL  High:  200-499 mg/dL  Very High:  Greater than or equal to 500 mg/dL    Direct Measure HDL  Female:  Greater than or equal to 50 mg/dL   Male:  Greater than or equal to 40 mg/dL    LDL Cholesterol  Desirable:  <100mg/dL  Above Desirable:  100-129 mg/dL   Borderline High:  130-159 mg/dL   High:  160-189 mg/dL   Very High:  >= 190 mg/dL    Non HDL Cholesterol  Desirable:  130 mg/dL  Above Desirable:  130-159 mg/dL  Borderline High:  160-189 mg/dL  High:  190-219 mg/dL  Very High:  Greater than or equal to 220 mg/dL   Comprehensive metabolic panel (BMP + Alb, Alk Phos, ALT, AST, Total. Bili, TP)   Result Value Ref Range    Sodium 138 133 - 144 mmol/L    Potassium 4.1 3.4 - 5.3 mmol/L    Chloride 108 94 - 109 mmol/L    Carbon Dioxide (CO2) 22 20 - 32 mmol/L    Anion Gap 8 3 - 14 mmol/L    Urea Nitrogen 10 7 - 30 mg/dL    Creatinine 0.91 0.66 - 1.25 mg/dL    Calcium 9.5 8.5 - 10.1 mg/dL    Glucose 92 70 - 99 mg/dL    Alkaline Phosphatase 135 40 - 150 U/L    AST 28 0 - 45 U/L    ALT 50 0 - 70 U/L    Protein Total 7.6 6.8 - 8.8 g/dL    Albumin 4.1 3.4 - 5.0 g/dL    Bilirubin Total 0.3 0.2 - 1.3 mg/dL    GFR Estimate >90 >60 mL/min/1.73m2      Comment:      As of July 11, 2021, eGFR is  calculated by the CKD-EPI creatinine equation, without race adjustment. eGFR can be influenced by muscle mass, exercise, and diet. The reported eGFR is an estimation only and is only applicable if the renal function is stable.   TSH with free T4 reflex   Result Value Ref Range    TSH 1.96 0.40 - 4.00 mU/L   Hemoglobin A1c   Result Value Ref Range    Hemoglobin A1C 5.7 (H) 0.0 - 5.6 %      Comment:      Normal <5.7%   Prediabetes 5.7-6.4%    Diabetes 6.5% or higher     Note: Adopted from ADA consensus guidelines.   CBC with platelets and differential   Result Value Ref Range    WBC Count 5.9 4.0 - 11.0 10e3/uL    RBC Count 5.81 4.40 - 5.90 10e6/uL    Hemoglobin 14.3 13.3 - 17.7 g/dL    Hematocrit 44.9 40.0 - 53.0 %    MCV 77 (L) 78 - 100 fL    MCH 24.6 (L) 26.5 - 33.0 pg    MCHC 31.8 31.5 - 36.5 g/dL    RDW 14.5 10.0 - 15.0 %    Platelet Count 308 150 - 450 10e3/uL    % Neutrophils 61 %    % Lymphocytes 29 %    % Monocytes 7 %    % Eosinophils 3 %    % Basophils 1 %    Absolute Neutrophils 3.6 1.6 - 8.3 10e3/uL    Absolute Lymphocytes 1.7 0.8 - 5.3 10e3/uL    Absolute Monocytes 0.4 0.0 - 1.3 10e3/uL    Absolute Eosinophils 0.2 0.0 - 0.7 10e3/uL    Absolute Basophils 0.0 0.0 - 0.2 10e3/uL       If you have any questions or concerns, please call the clinic at the number listed above.       Sincerely,      Jonah Leroy MD

## 2021-10-05 ASSESSMENT — PATIENT HEALTH QUESTIONNAIRE - PHQ9: SUM OF ALL RESPONSES TO PHQ QUESTIONS 1-9: 0

## 2021-10-05 ASSESSMENT — ANXIETY QUESTIONNAIRES: GAD7 TOTAL SCORE: 2

## 2021-12-23 ENCOUNTER — OFFICE VISIT (OUTPATIENT)
Dept: FAMILY MEDICINE | Facility: CLINIC | Age: 21
End: 2021-12-23
Payer: COMMERCIAL

## 2021-12-23 ENCOUNTER — TELEPHONE (OUTPATIENT)
Dept: FAMILY MEDICINE | Facility: CLINIC | Age: 21
End: 2021-12-23

## 2021-12-23 VITALS — DIASTOLIC BLOOD PRESSURE: 64 MMHG | SYSTOLIC BLOOD PRESSURE: 130 MMHG

## 2021-12-23 DIAGNOSIS — L21.9 SEBORRHEIC DERMATITIS: ICD-10-CM

## 2021-12-23 DIAGNOSIS — L21.9 SEBORRHEIC DERMATITIS: Primary | ICD-10-CM

## 2021-12-23 DIAGNOSIS — L29.9 LOCALIZED PRURITUS: ICD-10-CM

## 2021-12-23 DIAGNOSIS — L64.9 ANDROGENETIC ALOPECIA: ICD-10-CM

## 2021-12-23 PROCEDURE — 99203 OFFICE O/P NEW LOW 30 MIN: CPT | Performed by: PHYSICIAN ASSISTANT

## 2021-12-23 RX ORDER — KETOCONAZOLE 20 MG/ML
SHAMPOO TOPICAL
Qty: 120 ML | Refills: 4 | Status: SHIPPED | OUTPATIENT
Start: 2021-12-23 | End: 2023-03-30

## 2021-12-23 RX ORDER — KETOCONAZOLE 20 MG/ML
SHAMPOO TOPICAL DAILY PRN
Qty: 120 ML | Refills: 4 | Status: SHIPPED | OUTPATIENT
Start: 2021-12-23 | End: 2021-12-23

## 2021-12-23 RX ORDER — CLOBETASOL PROPIONATE 0.5 MG/ML
SOLUTION TOPICAL 2 TIMES DAILY
Qty: 50 ML | Refills: 3 | Status: SHIPPED | OUTPATIENT
Start: 2021-12-23 | End: 2023-03-30

## 2021-12-23 NOTE — PATIENT INSTRUCTIONS
Proper skin care from [facility]:    -Eliminate harsh soaps as they strip the natural oils from the skin, often resulting in dry itchy skin ( i.e. Dial, Zest, Ruthy Spring)  -Use mild soaps such as Cetaphil or Dove Sensitive Skin in the shower. You do not need to use soap on arms, legs, and trunk every time you shower unless visibly soiled.   -Avoid hot or cold showers.  -After showering, lightly dry off and apply moisturizing within 2-3 minutes. This will help trap moisture in the skin.   -Aggressive use of a moisturizer at least 1-2 times a day to the entire body (including -Vanicream, Cetaphil, Aquaphor or Cerave) and moisturize hands after every washing.  -We recommend using moisturizers that come in a tub that needs to be scooped out, not a pump. This has more of an oil base. It will hold moisture in your skin much better than a water base moisturizer. The above recommended are non-pore clogging.      Wear a sunscreen with at least SPF 30 on your face, ears, neck and V of the chest daily. Wear sunscreen on other areas of the body if those areas are exposed to the sun throughout the day. Sunscreens can contain physical and/or chemical blockers. Physical blockers are less likely to clog pores, these include zinc oxide and titanium dioxide. Reapply every two hour and after swimming.     Sunscreen examples: https://www.ewg.org/sunscreen/    UV radiation  UVA radiation remains constant throughout the day and throughout the year. It is a longer wavelength than UVB and therefore penetrates deeper into the skin leading to immediate and delayed tanning, photoaging, and skin cancer. 70-80% of UVA and UVB radiation occurs between the hours of 10am-2pm.  UVB radiation  UVB radiation causes the most harmful effects and is more significant during the summer months. However, snow and ice can reflect UVB radiation leading to skin damage during the winter months as well. UVB radiation is responsible for tanning,  burning, inflammation, delayed erythema (pinkness), pigmentation (brown spots), and skin cancer.     I recommend self monthly full body exams and yearly full body exams with a dermatology provider. If you develop a new or changing lesion please follow up for examination. Most skin cancers are pink and scaly or pink and pearly. However, we do see blue/brown/black skin cancers.  Consider the ABCDEs of melanoma when giving yourself your monthly full body exam ( don't forget the groin, buttocks, feet, toes, etc). A-asymmetry, B-borders, C-color, D-diameter, E-elevation or evolving. If you see any of these changes please follow up in clinic. If you cannot see your back I recommend purchasing a hand held mirror to use with a larger wall mirror.      Male pattern hair loss  Androgenetic alopecia  [email or web address]    Male pattern alopecia or androgenetic alopecia is mediated by dihydrotestosterone causing miniaturization of the hair follicles. Hair loss if first seen on the anterior scalp and bitemporal regions. The only clinically proven methods to prevent hair loss are Finasteride and Minoxidil. Neither Finasteride nor Minoxidil can cause hair to regrow. Once treatment is discontinued hair loss will progress.    Side effects of oral Finasteride include and are not limited to impotence, decreased libido, abnormal ejaculation, sexual dysfunction and gynecomastia.    Side effects of Minoxidil (Rogaine) include contact dermatitis, pruritis (itch), and skin irritation.      Seborrheic Dermatitis  Chronic condition that cannot be cured, but it can be managed.     Scalp:   Nizoral: Wash scalp daily with a medicated shampoo discussed with provider. Wet affected area daily, apply shampoo and lather, let sit for 3-5 minutes and then rinse.   If multiple shampoos discussed begin  two prescription shampoos or one prescription shampoo and one over the counter shampoo  (examples: Head and shoulders, Selsen Blue, Ketoconazole, T-Mark,  and/or T-gel.     clobetasol: Apply a thin layer to affected area on scalp 2x a day x 4 weeks, tapering with improvement. Do not apply to face or body folds.     Side effects of topical steroids including but not limited to atrophy (skin thinning), striae (stretch marks) telangiectasias, steroid acne, and others. Do not apply to normal skin. Do not apply to discolored skin that does not have rash present.     Follow up in 6-8 weeks.

## 2021-12-23 NOTE — PROGRESS NOTES
[facility] Dermatology Note  Encounter Date: Dec 23, 2021  Office Visit   ____________________________________________    Assessment & Plan:    1.seborrheic dermatitis, localized pruritis  Start topical clobetasol and ketoconazole shampoo. Once the inflammation decreases, will see if hair starts to regrow.   Chronic condition that cannot be cured, but it can be managed.     Scalp:   Nizoral: Wash scalp daily with a medicated shampoo discussed with provider. Wet affected area daily, apply shampoo and lather, let sit for 3-5 minutes and then rinse.   If multiple shampoos discussed begin  two prescription shampoos or one prescription shampoo and one over the counter shampoo  (examples: Head and shoulders, Selsen Blue, Ketoconazole, T-Sal, and/or T-gel.     clobetasol: Apply a thin layer to affected area on scalp 2x a day x 4 weeks, tapering with improvement. Do not apply to face or body folds.     Side effects of topical steroids including but not limited to atrophy (skin thinning), striae (stretch marks) telangiectasias, steroid acne, and others. Do not apply to normal skin. Do not apply to discolored skin that does not have rash present.     2) androgenetic alopecia  Disc rogain, finasteride and PRP    Male pattern alopecia or androgenetic alopecia is mediated by dihydrotestosterone causing miniaturization of the hair follicles. Hair loss if first seen on the anterior scalp and bitemporal regions. The only clinically proven methods to prevent hair loss are Finasteride and Minoxidil. Neither Finasteride nor Minoxidil can cause hair to regrow. Once treatment is discontinued hair loss will progress.    Side effects of oral Finasteride include and are not limited to impotence, decreased libido, abnormal ejaculation, sexual dysfunction and gynecomastia.    Side effects of Minoxidil (Rogaine) include contact dermatitis, pruritis (itch), and skin irritation.    Follow-up: 2 month(s) in-person, or earlier  for new or changing lesions    Labs and office visit notes reviewed:  10/4/21 pcp    Provider Disclosure:   The documentation recorded by the scribe accurately reflects the services I personally performed and the decisions made by me.    Ivette Estrada, MS, NORMA      Scribe Disclosure:  I, Laura Maliha, am serving as a scribe to document services personally performed by Ivette Estrada PA-C based on data collection and the provider's statements to me.   ____________________________________________________    HPI:  Mr. Darwin Todd is a(n) 21 year old male who presents today as a new patient for hair loss, referred by Dr. Leroy. Patient states this has been present for 1 year.  Patient reports the following symptoms: Itching and circular patches of hair loss on sides of scalp.  Patient reports the following previous treatments: None. Washes hair daily with only conditioner.  Patient reports the following modifying factors: None.  Associated symptoms: None.  Patient has no other skin complaints today.  Remainder of the HPI, Meds, PMH, Allergies, FH, and SH was reviewed in chart.       Pertinent findings: No family history of hair loss.     Medications:  Current Outpatient Medications   Medication     metFORMIN (GLUCOPHAGE) 500 MG tablet     polyethylene glycol (MIRALAX) 17 GM/Dose powder     No current facility-administered medications for this visit.        Past Medical History:   Patient Active Problem List   Diagnosis     Prediabetes     Hyperlipidemia LDL goal <100     Sickle cell trait (H)     Mild major depression (H)     Anxiety     Marijuana use     History of iron deficiency anemia     Environmental allergies     Raynaud's disease without gangrene     Morbid obesity (H)     Past Medical History:   Diagnosis Date     Anxiety      Environmental allergies      Hyperlipidemia LDL goal <100      Marijuana use      Mild major depression (H)      Prediabetes      Sickle cell trait (H)        Past  Surgical History:   Past Surgical History:   Procedure Laterality Date     ESOPHAGOSCOPY, GASTROSCOPY, DUODENOSCOPY (EGD), COMBINED N/A 8/24/2016    normal     NO HISTORY OF SURGERY         Family History:  Family History   Problem Relation Age of Onset     Pancreatitis Father      Diabetes Father      Hyperlipidemia Father      Substance Abuse Father      Diabetes Mother      Hyperlipidemia Mother      Liver Disease Mother         fatty     Diabetes Brother         pre-diabetes     Cancer Paternal Grandmother 68        stomach     Diabetes Paternal Grandmother      Glaucoma Maternal Grandmother      Hypertension Maternal Grandmother      Hyperlipidemia Maternal Grandmother      Suicide Maternal Grandfather        Social History:  Social History     Tobacco Use     Smoking status: Former Smoker     Packs/day: 0.25     Smokeless tobacco: Never Used     Tobacco comment:  quit 1/2018   Substance Use Topics     Alcohol use: No     Alcohol/week: 0.0 standard drinks     Comment: 1-5 per year          Review Of Systems:  Skin: hair loss, itching  Eyes: negative  Ears/Nose/Throat: negative  Respiratory: No shortness of breath, dyspnea on exertion, cough, or hemoptysis  Cardiovascular: negative  Gastrointestinal: negative  Genitourinary: negative  Musculoskeletal: negative  Neurologic: negative  Psychiatric: negative  Hematologic/Lymphatic/Immunologic: negative  Endocrine: negative      Objective:     There were no vitals taken for this visit.  Eyes: Conjunctivae/lids: Normal   ENT: Lips:  Normal  MSK: Normal  Cardiovascular: Peripheral edema none  Pulm: Breathing Normal  Neuro/Psych: Orientation: Normal; Mood/Affect: Normal, NAD, WDWN  Pt accompanied by: mother  Following areas examined: Scalp, face, eyelids, lips, neck.   Almodovar skin type:ii   Findings:  Thinning of hair at frontotemporal scalp  Pink scaly patches on frontal, mid and crown of scalp  No wd annular patches of hair loss today       CC [place]  [place]  [id number] [place] [address] on close of this encounter.

## 2021-12-23 NOTE — LETTER
12/23/2021         RE: Darwin Todd  [address]  [facility] [address]        Dear Colleague,    Thank you for referring your patient, Darwin Todd, to the [facility] [place]. Please see a copy of my visit note below.    [facility] Dermatology Note  Encounter Date: Dec 23, 2021  Office Visit   ____________________________________________    Assessment & Plan:    1.seborrheic dermatitis, localized pruritis  Start topical clobetasol and ketoconazole shampoo. Once the inflammation decreases, will see if hair starts to regrow.   Chronic condition that cannot be cured, but it can be managed.     Scalp:   Nizoral: Wash scalp daily with a medicated shampoo discussed with provider. Wet affected area daily, apply shampoo and lather, let sit for 3-5 minutes and then rinse.   If multiple shampoos discussed begin  two prescription shampoos or one prescription shampoo and one over the counter shampoo  (examples: Head and shoulders, Selsen Blue, Ketoconazole, T-Sal, and/or T-gel.     clobetasol: Apply a thin layer to affected area on scalp 2x a day x 4 weeks, tapering with improvement. Do not apply to face or body folds.     Side effects of topical steroids including but not limited to atrophy (skin thinning), striae (stretch marks) telangiectasias, steroid acne, and others. Do not apply to normal skin. Do not apply to discolored skin that does not have rash present.     2) androgenetic alopecia  Disc rogain, finasteride and PRP    Male pattern alopecia or androgenetic alopecia is mediated by dihydrotestosterone causing miniaturization of the hair follicles. Hair loss if first seen on the anterior scalp and bitemporal regions. The only clinically proven methods to prevent hair loss are Finasteride and Minoxidil. Neither Finasteride nor Minoxidil can cause hair to regrow. Once treatment is discontinued hair loss will progress.    Side effects of oral Finasteride include and  are not limited to impotence, decreased libido, abnormal ejaculation, sexual dysfunction and gynecomastia.    Side effects of Minoxidil (Rogaine) include contact dermatitis, pruritis (itch), and skin irritation.    Follow-up: 2 month(s) in-person, or earlier for new or changing lesions    Labs and office visit notes reviewed:  10/4/21 pcp    Provider Disclosure:   The documentation recorded by the scribe accurately reflects the services I personally performed and the decisions made by me.    Ivette Estrada, MS, [place]      Scribe Disclosure:  I, Laura Jett, am serving as a scribe to document services personally performed by Ivette Estrada PA-C based on data collection and the provider's statements to me.   ____________________________________________________    HPI:  Mr. Darwin Todd is a(n) 21 year old male who presents today as a new patient for hair loss, referred by Dr. Leroy. Patient states this has been present for 1 year.  Patient reports the following symptoms: Itching and circular patches of hair loss on sides of scalp.  Patient reports the following previous treatments: None. Washes hair daily with only conditioner.  Patient reports the following modifying factors: None.  Associated symptoms: None.  Patient has no other skin complaints today.  Remainder of the HPI, Meds, PMH, Allergies, FH, and SH was reviewed in chart.       Pertinent findings: No family history of hair loss.     Medications:  Current Outpatient Medications   Medication     metFORMIN (GLUCOPHAGE) 500 MG tablet     polyethylene glycol (MIRALAX) 17 GM/Dose powder     No current facility-administered medications for this visit.        Past Medical History:   Patient Active Problem List   Diagnosis     Prediabetes     Hyperlipidemia LDL goal <100     Sickle cell trait (H)     Mild major depression (H)     Anxiety     Marijuana use     History of iron deficiency anemia     Environmental allergies     Raynaud's disease without  gangrene     Morbid obesity (H)     Past Medical History:   Diagnosis Date     Anxiety      Environmental allergies      Hyperlipidemia LDL goal <100      Marijuana use      Mild major depression (H)      Prediabetes      Sickle cell trait (H)        Past Surgical History:   Past Surgical History:   Procedure Laterality Date     ESOPHAGOSCOPY, GASTROSCOPY, DUODENOSCOPY (EGD), COMBINED N/A 8/24/2016    normal     NO HISTORY OF SURGERY         Family History:  Family History   Problem Relation Age of Onset     Pancreatitis Father      Diabetes Father      Hyperlipidemia Father      Substance Abuse Father      Diabetes Mother      Hyperlipidemia Mother      Liver Disease Mother         fatty     Diabetes Brother         pre-diabetes     Cancer Paternal Grandmother 68        stomach     Diabetes Paternal Grandmother      Glaucoma Maternal Grandmother      Hypertension Maternal Grandmother      Hyperlipidemia Maternal Grandmother      Suicide Maternal Grandfather        Social History:  Social History     Tobacco Use     Smoking status: Former Smoker     Packs/day: 0.25     Smokeless tobacco: Never Used     Tobacco comment:  quit 1/2018   Substance Use Topics     Alcohol use: No     Alcohol/week: 0.0 standard drinks     Comment: 1-5 per year          Review Of Systems:  Skin: hair loss, itching  Eyes: negative  Ears/Nose/Throat: negative  Respiratory: No shortness of breath, dyspnea on exertion, cough, or hemoptysis  Cardiovascular: negative  Gastrointestinal: negative  Genitourinary: negative  Musculoskeletal: negative  Neurologic: negative  Psychiatric: negative  Hematologic/Lymphatic/Immunologic: negative  Endocrine: negative      Objective:     There were no vitals taken for this visit.  Eyes: Conjunctivae/lids: Normal   ENT: Lips:  Normal  MSK: Normal  Cardiovascular: Peripheral edema none  Pulm: Breathing Normal  Neuro/Psych: Orientation: Normal; Mood/Affect: Normal, NAD, WDWN  Pt accompanied by: mother  Following  areas examined: Scalp, face, eyelids, lips, neck.   Almodovar skin type:ii   Findings:  Thinning of hair at frontotemporal scalp  Pink scaly patches on frontal, mid and crown of scalp  No wd annular patches of hair loss today       CC Jonah Leroy [place]  [address] [place] [address] on close of this encounter.        Again, thank you for allowing me to participate in the care of your patient.        Sincerely,        Ivette Estrada PA-C

## 2021-12-23 NOTE — TELEPHONE ENCOUNTER
[facility] Call Center    Phone Message    May a detailed message be left on voicemail: no     Reason for Call: Medication Question or concern regarding medication   Prescription Clarification  Name of Medication:   ketoconazole (NIZORAL) 2 % external shampoo       Prescribing Provider:   Ivette Estrada     Pharmacy:   [id number] - [place], [place] - [address] AT [place] [address] [address]     What on the order needs clarification?   Pharamacy calling for clarification on directions. They note directions show both daily use and 3x a week. Please call to clarify. Thanks!      Action Taken: Other: EC Skin    Travel Screening: Not Applicable

## 2022-01-15 ENCOUNTER — HOSPITAL ENCOUNTER (EMERGENCY)
Facility: CLINIC | Age: 22
Discharge: HOME | End: 2022-01-15
Admitting: EMERGENCY MEDICINE
Payer: COMMERCIAL

## 2022-01-15 ENCOUNTER — NURSE TRIAGE (OUTPATIENT)
Dept: NURSING | Facility: CLINIC | Age: 22
End: 2022-01-15
Payer: COMMERCIAL

## 2022-01-15 VITALS
DIASTOLIC BLOOD PRESSURE: 81 MMHG | HEIGHT: 67 IN | WEIGHT: 229 LBS | BODY MASS INDEX: 35.94 KG/M2 | OXYGEN SATURATION: 96 % | TEMPERATURE: 100.7 F | RESPIRATION RATE: 20 BRPM | SYSTOLIC BLOOD PRESSURE: 150 MMHG | HEART RATE: 95 BPM

## 2022-01-15 LAB
ANION GAP SERPL CALCULATED.3IONS-SCNC: 8 MMOL/L (ref 3–14)
BASOPHILS # BLD AUTO: 0 10E3/UL (ref 0–0.2)
BASOPHILS NFR BLD AUTO: 0 %
BUN SERPL-MCNC: 17 MG/DL (ref 7–30)
CALCIUM SERPL-MCNC: 9.6 MG/DL (ref 8.5–10.1)
CHLORIDE BLD-SCNC: 108 MMOL/L (ref 94–109)
CO2 SERPL-SCNC: 20 MMOL/L (ref 20–32)
CREAT SERPL-MCNC: 0.71 MG/DL (ref 0.66–1.25)
EOSINOPHIL # BLD AUTO: 0 10E3/UL (ref 0–0.7)
EOSINOPHIL NFR BLD AUTO: 0 %
ERYTHROCYTE [DISTWIDTH] IN BLOOD BY AUTOMATED COUNT: 13.4 % (ref 10–15)
GFR SERPL CREATININE-BSD FRML MDRD: >90 ML/MIN/1.73M2
GLUCOSE BLD-MCNC: 130 MG/DL (ref 70–99)
HCT VFR BLD AUTO: 51.3 % (ref 40–53)
HGB BLD-MCNC: 15.5 G/DL (ref 13.3–17.7)
HOLD SPECIMEN: NORMAL
IMM GRANULOCYTES # BLD: 0 10E3/UL
IMM GRANULOCYTES NFR BLD: 0 %
LYMPHOCYTES # BLD AUTO: 0.4 10E3/UL (ref 0.8–5.3)
LYMPHOCYTES NFR BLD AUTO: 3 %
MCH RBC QN AUTO: 24.4 PG (ref 26.5–33)
MCHC RBC AUTO-ENTMCNC: 30.2 G/DL (ref 31.5–36.5)
MCV RBC AUTO: 81 FL (ref 78–100)
MONOCYTES # BLD AUTO: 0.4 10E3/UL (ref 0–1.3)
MONOCYTES NFR BLD AUTO: 3 %
NEUTROPHILS # BLD AUTO: 11.1 10E3/UL (ref 1.6–8.3)
NEUTROPHILS NFR BLD AUTO: 94 %
NRBC # BLD AUTO: 0 10E3/UL
NRBC BLD AUTO-RTO: 0 /100
PLATELET # BLD AUTO: 291 10E3/UL (ref 150–450)
POTASSIUM BLD-SCNC: 4.1 MMOL/L (ref 3.4–5.3)
RBC # BLD AUTO: 6.35 10E6/UL (ref 4.4–5.9)
SODIUM SERPL-SCNC: 136 MMOL/L (ref 133–144)
WBC # BLD AUTO: 12 10E3/UL (ref 4–11)

## 2022-01-15 PROCEDURE — 80048 BASIC METABOLIC PNL TOTAL CA: CPT | Performed by: EMERGENCY MEDICINE

## 2022-01-15 PROCEDURE — 36415 COLL VENOUS BLD VENIPUNCTURE: CPT | Performed by: EMERGENCY MEDICINE

## 2022-01-15 PROCEDURE — 999N000104 HC STATISTIC NO CHARGE

## 2022-01-15 PROCEDURE — 250N000011 HC RX IP 250 OP 636: Performed by: EMERGENCY MEDICINE

## 2022-01-15 PROCEDURE — 85025 COMPLETE CBC W/AUTO DIFF WBC: CPT | Performed by: EMERGENCY MEDICINE

## 2022-01-15 RX ORDER — ONDANSETRON 4 MG/1
4 TABLET, ORALLY DISINTEGRATING ORAL ONCE
Status: COMPLETED | OUTPATIENT
Start: 2022-01-15 | End: 2022-01-15

## 2022-01-15 RX ADMIN — ONDANSETRON 4 MG: 4 TABLET, ORALLY DISINTEGRATING ORAL at 09:10

## 2022-01-15 ASSESSMENT — MIFFLIN-ST. JEOR: SCORE: 1992.37

## 2022-01-15 NOTE — ED NOTES
"Writer was called out by triage nurse to speak with patient and mother. Mother upset with visitor policy and wait times. Mother states she is patient's POA. There is no documentation of this per charting. Mother was informed that due to her being the POA she could come back with son. Mother also educated it would be helpful to bring this paperwork next time so we could scan this into patient's chart. Mother is agreeable to this. Mother also upset about wait times. She states that patient is not able to sit and wait to be seen any longer. Patient requesting to have IV removed as well. Writer removed IV. Mother also upset with triage nurse. She states that they \" where very rude'. Writer apologized to patient and mother.  "

## 2022-01-15 NOTE — ED TRIAGE NOTES
Arrives from home. States loose stools and pain started last night. States back pain. Vomiting states unable to keep anything down. States SOB due to pain.

## 2022-02-24 ENCOUNTER — OFFICE VISIT (OUTPATIENT)
Dept: FAMILY MEDICINE | Facility: CLINIC | Age: 22
End: 2022-02-24
Payer: COMMERCIAL

## 2022-02-24 VITALS — DIASTOLIC BLOOD PRESSURE: 64 MMHG | SYSTOLIC BLOOD PRESSURE: 122 MMHG

## 2022-02-24 DIAGNOSIS — L64.9 ANDROGENETIC ALOPECIA: Primary | ICD-10-CM

## 2022-02-24 DIAGNOSIS — L29.9 LOCALIZED PRURITUS: ICD-10-CM

## 2022-02-24 DIAGNOSIS — L21.9 SEBORRHEIC DERMATITIS: ICD-10-CM

## 2022-02-24 PROCEDURE — 99213 OFFICE O/P EST LOW 20 MIN: CPT | Performed by: PHYSICIAN ASSISTANT

## 2022-02-24 RX ORDER — FINASTERIDE 1 MG/1
1 TABLET, FILM COATED ORAL DAILY
Qty: 90 TABLET | Refills: 1 | Status: SHIPPED | OUTPATIENT
Start: 2022-02-24 | End: 2023-03-30

## 2022-02-24 NOTE — PATIENT INSTRUCTIONS
Androgenetic alopecia  Begin finasteride 1mg by mouth  Begin over the counter mens rogain    Male pattern alopecia or androgenetic alopecia is mediated by dihydrotestosterone causing miniaturization of the hair follicles. Hair loss if first seen on the anterior scalp and bitemporal regions. The only clinically proven methods to prevent hair loss are Finasteride and Minoxidil. Neither Finasteride nor Minoxidil can cause hair to regrow. Once treatment is discontinued hair loss will progress.    Side effects of oral Finasteride include and are not limited to impotence, decreased libido, abnormal ejaculation, sexual dysfunction and gynecomastia.    Side effects of Minoxidil (Rogaine) include contact dermatitis, pruritis (itch), and skin irritation.    Seborrheic Dermatitis  Chronic condition that cannot be cured, but it can be managed.     Scalp:   nizoral Wash scalp daily with a medicated shampoo discussed with provider. Wet affected area daily, apply shampoo and lather, let sit for 3-5 minutes and then rinse.   If multiple shampoos discussed begin  two prescription shampoos or one prescription shampoo and one over the counter shampoo  (examples: Head and shoulders, Selsen Blue, Ketoconazole, T-Sal, and/or T-gel.     Clobetasol when flared: Apply a thin layer to affected area on scalp 2x a day x 4 weeks, tapering with improvement. Do not apply to face or body folds.         Side effects of topical steroids including but not limited to atrophy (skin thinning), striae (stretch marks) telangiectasias, steroid acne, and others. Do not apply to normal skin. Do not apply to discolored skin that does not have rash present.

## 2022-02-24 NOTE — PROGRESS NOTES
[facility] Note  Encounter Date: Feb 24, 2022  Office Visit   ____________________________________________    Assessment & Plan:    1. Seborrheic dermatitis, localized pruritis, improved  Given improvement in irritation, will discontinue clobetasol.  Continue ketoconazole shampoo for maintenance.   Chronic condition that cannot be cured, but it can be managed.      Scalp:   Nizoral: Wash scalp three times weekly with a medicated shampoo discussed with provider. Taper with improvement. Wet affected area daily, apply shampoo and lather, let sit for 3-5 minutes and then rinse.      Clobetasol: Given improvement, taper use. If experiencing a flare, apply a thin layer to affected area on scalp 2x a day x 2-4 weeks, tapering with improvement. Do not apply to face or body folds.      Side effects of topical steroids including but not limited to atrophy (skin thinning), striae (stretch marks) telangiectasias, steroid acne, and others. Do not apply to normal skin. Do not apply to discolored skin that does not have rash present.      2. Androgenetic alopecia  Disc rogain, finasteride and PRP     Start applying OTC Rogaine 5% foam 1x daily  Begin finasteride 1 mg PO daily     Male pattern alopecia or androgenetic alopecia is mediated by dihydrotestosterone causing miniaturization of the hair follicles. Hair loss if first seen on the anterior scalp and bitemporal regions. The only clinically proven methods to prevent hair loss are Finasteride and Minoxidil. Neither Finasteride nor Minoxidil can cause hair to regrow. Once treatment is discontinued hair loss will progress.     Side effects of oral Finasteride include and are not limited to impotence, decreased libido, abnormal ejaculation, sexual dysfunction and gynecomastia.     Side effects of Minoxidil (Rogaine) include contact dermatitis, pruritis (itch), and skin irritation.      Follow-up: 4-5 month(s) in-person, or earlier for new or changing lesions      Labs  and office visit notes reviewed:  - Derm note from 12/23/21    Provider Disclosure:   The documentation recorded by the scribe accurately reflects the services I personally performed and the decisions made by me.    Ivette Estrada, MS, [place]      Scribe Disclosure:  I, Laura Jett, am serving as a scribe to document services personally performed by Ivette Estrada PA-C based on data collection and the provider's statements to me.   ____________________________________________________    HPI:  Mr. Darwin Todd is a(n) 21 year old male who presents today as a return patient for seborrheic dermatitis.  Patient states this has been present for over a year.  Patient reports the following symptoms: itching and irritation is improved on nizoral and clobetasol However, still experiencing hair loss.  Patient reports the following previous treatments: clobetasol and ketoconazole shampoo. States is having hair loss.  Patient reports the following modifying factors: None.  Associated symptoms: None.  Patient has no other skin complaints today.  Remainder of the HPI, Meds, PMH, Allergies, FH, and SH was reviewed in chart.       Pertinent findings: No family history of hair loss.     Medications:  Current Outpatient Medications   Medication     clobetasol (TEMOVATE) 0.05 % external solution     finasteride (PROPECIA) 1 MG tablet     ketoconazole (NIZORAL) 2 % external shampoo     polyethylene glycol (MIRALAX) 17 GM/Dose powder     No current facility-administered medications for this visit.        Past Medical History:   Patient Active Problem List   Diagnosis     Prediabetes     Hyperlipidemia LDL goal <100     Sickle cell trait (H)     Mild major depression (H)     Anxiety     Marijuana use     History of iron deficiency anemia     Environmental allergies     Raynaud's disease without gangrene     Morbid obesity (H)     Past Medical History:   Diagnosis Date     Anxiety      Environmental allergies       Hyperlipidemia LDL goal <100      Marijuana use      Mild major depression (H)      Prediabetes      Sickle cell trait (H)        Past Surgical History:   Past Surgical History:   Procedure Laterality Date     ESOPHAGOSCOPY, GASTROSCOPY, DUODENOSCOPY (EGD), COMBINED N/A 8/24/2016    normal     NO HISTORY OF SURGERY         Family History:  Family History   Problem Relation Age of Onset     Pancreatitis Father      Diabetes Father      Hyperlipidemia Father      Substance Abuse Father      Diabetes Mother      Hyperlipidemia Mother      Liver Disease Mother         fatty     Diabetes Brother         pre-diabetes     Cancer Paternal Grandmother 68        stomach     Diabetes Paternal Grandmother      Glaucoma Maternal Grandmother      Hypertension Maternal Grandmother      Hyperlipidemia Maternal Grandmother      Suicide Maternal Grandfather        Social History:  Social History     Tobacco Use     Smoking status: Former Smoker     Packs/day: 0.25     Smokeless tobacco: Never Used     Tobacco comment:  quit 1/2018   Substance Use Topics     Alcohol use: No     Alcohol/week: 0.0 standard drinks     Comment: 1-5 per year          Review Of Systems:  Skin: hair loss  Eyes: negative  Ears/Nose/Throat: negative  Respiratory: No shortness of breath, dyspnea on exertion, cough, or hemoptysis  Cardiovascular: negative  Gastrointestinal: negative  Genitourinary: negative  Musculoskeletal: negative  Neurologic: negative  Psychiatric: negative  Hematologic/Lymphatic/Immunologic: negative  Endocrine: negative      Objective:     /64   Eyes: Conjunctivae/lids: Normal   ENT: Lips:  Normal  MSK: Normal  Cardiovascular: Peripheral edema none  Pulm: Breathing Normal  Neuro/Psych: Orientation: Normal; Mood/Affect: Normal, NAD, WDWN  Pt accompanied by: self  Following areas examined: Scalp, neck, ears, scalp.   Almodovar skin type: ii  Findings:  No flaking or erythema of scalp  patchy thinning of hair at frontal, mid, crown of  scalp         CC No referring provider defined for this encounter. on close of this encounter.

## 2022-05-16 NOTE — PROGRESS NOTES
Pre-Visit Planning   Next 5 appointments (look out 90 days)    May 17, 2022  9:00 AM  (Arrive by 8:40 AM)  Provider Visit with Jonah Leroy MD  [facility] ([facility] ) [address] [address] [phone]        Appointment Notes for this encounter:   sweating alot- stomach pain for weeks    Questionnaires Reviewed/Assigned  No additional questionnaires are needed    Patient preferred phone number: [phone]    Unable to reach. Left voicemail. Advised patient to call clinic back at [phone].    Sophia De Jesus/

## 2022-05-17 ENCOUNTER — OFFICE VISIT (OUTPATIENT)
Dept: FAMILY MEDICINE | Facility: CLINIC | Age: 22
End: 2022-05-17
Payer: COMMERCIAL

## 2022-05-17 VITALS
OXYGEN SATURATION: 98 % | SYSTOLIC BLOOD PRESSURE: 116 MMHG | TEMPERATURE: 98.2 F | HEART RATE: 62 BPM | RESPIRATION RATE: 12 BRPM | WEIGHT: 221 LBS | DIASTOLIC BLOOD PRESSURE: 74 MMHG | HEIGHT: 67 IN | BODY MASS INDEX: 34.69 KG/M2

## 2022-05-17 DIAGNOSIS — R61 GENERALIZED HYPERHIDROSIS: ICD-10-CM

## 2022-05-17 DIAGNOSIS — R10.9 CENTRAL ABDOMINAL PAIN: Primary | ICD-10-CM

## 2022-05-17 LAB
BASOPHILS # BLD AUTO: 0 10E3/UL (ref 0–0.2)
BASOPHILS NFR BLD AUTO: 1 %
EOSINOPHIL # BLD AUTO: 0.3 10E3/UL (ref 0–0.7)
EOSINOPHIL NFR BLD AUTO: 3 %
ERYTHROCYTE [DISTWIDTH] IN BLOOD BY AUTOMATED COUNT: 14.4 % (ref 10–15)
HBA1C MFR BLD: 5.6 % (ref 0–5.6)
HCT VFR BLD AUTO: 42.5 % (ref 40–53)
HGB BLD-MCNC: 13.6 G/DL (ref 13.3–17.7)
LYMPHOCYTES # BLD AUTO: 2.3 10E3/UL (ref 0.8–5.3)
LYMPHOCYTES NFR BLD AUTO: 27 %
MCH RBC QN AUTO: 25.4 PG (ref 26.5–33)
MCHC RBC AUTO-ENTMCNC: 32 G/DL (ref 31.5–36.5)
MCV RBC AUTO: 79 FL (ref 78–100)
MONOCYTES # BLD AUTO: 0.5 10E3/UL (ref 0–1.3)
MONOCYTES NFR BLD AUTO: 5 %
NEUTROPHILS # BLD AUTO: 5.4 10E3/UL (ref 1.6–8.3)
NEUTROPHILS NFR BLD AUTO: 64 %
PLATELET # BLD AUTO: 268 10E3/UL (ref 150–450)
RBC # BLD AUTO: 5.36 10E6/UL (ref 4.4–5.9)
WBC # BLD AUTO: 8.4 10E3/UL (ref 4–11)

## 2022-05-17 PROCEDURE — 36415 COLL VENOUS BLD VENIPUNCTURE: CPT | Performed by: FAMILY MEDICINE

## 2022-05-17 PROCEDURE — 99214 OFFICE O/P EST MOD 30 MIN: CPT | Performed by: FAMILY MEDICINE

## 2022-05-17 PROCEDURE — 80050 GENERAL HEALTH PANEL: CPT | Performed by: FAMILY MEDICINE

## 2022-05-17 PROCEDURE — 80061 LIPID PANEL: CPT | Performed by: FAMILY MEDICINE

## 2022-05-17 PROCEDURE — 83036 HEMOGLOBIN GLYCOSYLATED A1C: CPT | Performed by: FAMILY MEDICINE

## 2022-05-17 RX ORDER — SENNA AND DOCUSATE SODIUM 50; 8.6 MG/1; MG/1
1 TABLET, FILM COATED ORAL AT BEDTIME
Qty: 7 TABLET | Refills: 0 | Status: SHIPPED | OUTPATIENT
Start: 2022-05-17 | End: 2023-03-30

## 2022-05-17 ASSESSMENT — ANXIETY QUESTIONNAIRES
7. FEELING AFRAID AS IF SOMETHING AWFUL MIGHT HAPPEN: NOT AT ALL
1. FEELING NERVOUS, ANXIOUS, OR ON EDGE: NOT AT ALL
6. BECOMING EASILY ANNOYED OR IRRITABLE: NOT AT ALL
3. WORRYING TOO MUCH ABOUT DIFFERENT THINGS: NOT AT ALL
GAD7 TOTAL SCORE: 0
GAD7 TOTAL SCORE: 0
4. TROUBLE RELAXING: NOT AT ALL
GAD7 TOTAL SCORE: 0
5. BEING SO RESTLESS THAT IT IS HARD TO SIT STILL: NOT AT ALL
7. FEELING AFRAID AS IF SOMETHING AWFUL MIGHT HAPPEN: NOT AT ALL
2. NOT BEING ABLE TO STOP OR CONTROL WORRYING: NOT AT ALL

## 2022-05-17 ASSESSMENT — ENCOUNTER SYMPTOMS
NAUSEA: 0
FEVER: 0
HEMATOCHEZIA: 0
VOMITING: 0

## 2022-05-17 NOTE — LETTER
May 20, 2022      Kendall Todd  [address] [facility] [address]        Dear ,    We are writing to inform you of your normal test results.      Resulted Orders   Comprehensive metabolic panel (BMP + Alb, Alk Phos, ALT, AST, Total. Bili, TP)   Result Value Ref Range    Sodium 138 133 - 144 mmol/L    Potassium 4.1 3.4 - 5.3 mmol/L    Chloride 109 94 - 109 mmol/L    Carbon Dioxide (CO2) 32 20 - 32 mmol/L    Anion Gap <1 (L) 3 - 14 mmol/L    Urea Nitrogen 11 7 - 30 mg/dL    Creatinine 0.93 0.66 - 1.25 mg/dL    Calcium 9.3 8.5 - 10.1 mg/dL    Glucose 82 70 - 99 mg/dL    Alkaline Phosphatase 119 40 - 150 U/L    AST 26 0 - 45 U/L    ALT 35 0 - 70 U/L    Protein Total 6.8 6.8 - 8.8 g/dL    Albumin 3.8 3.4 - 5.0 g/dL    Bilirubin Total 0.2 0.2 - 1.3 mg/dL    GFR Estimate >90 >60 mL/min/1.73m2      Comment:      Effective December 21, 2021 eGFRcr in adults is calculated using the 2021 CKD-EPI creatinine equation which includes age and gender (Cathy et al., NE, DOI: 10.1056/RCLBtz2820083)   Lipid panel reflex to direct LDL Fasting   Result Value Ref Range    Cholesterol 160 <200 mg/dL    Triglycerides 63 <150 mg/dL    Direct Measure HDL 46 >=40 mg/dL    LDL Cholesterol Calculated 101 (H) <=100 mg/dL    Non HDL Cholesterol 114 <130 mg/dL    Patient Fasting > 8hrs? Yes     Narrative    Cholesterol  Desirable:  <200 mg/dL    Triglycerides  Normal:  Less than 150 mg/dL  Borderline High:  150-199 mg/dL  High:  200-499 mg/dL  Very High:  Greater than or equal to 500 mg/dL    Direct Measure HDL  Female:  Greater than or equal to 50 mg/dL   Male:  Greater than or equal to 40 mg/dL    LDL Cholesterol  Desirable:  <100mg/dL  Above Desirable:  100-129 mg/dL   Borderline High:  130-159 mg/dL   High:  160-189 mg/dL   Very High:  >= 190 mg/dL    Non HDL Cholesterol  Desirable:  130 mg/dL  Above Desirable:  130-159 mg/dL  Borderline High:  160-189 mg/dL  High:  190-219 mg/dL  Very High:  Greater than or equal to 220  mg/dL   Hemoglobin A1c   Result Value Ref Range    Hemoglobin A1C 5.6 0.0 - 5.6 %      Comment:      Normal <5.7%   Prediabetes 5.7-6.4%    Diabetes 6.5% or higher     Note: Adopted from ADA consensus guidelines.   TSH with free T4 reflex   Result Value Ref Range    TSH 1.03 0.40 - 4.00 mU/L   CBC with platelets and differential   Result Value Ref Range    WBC Count 8.4 4.0 - 11.0 10e3/uL    RBC Count 5.36 4.40 - 5.90 10e6/uL    Hemoglobin 13.6 13.3 - 17.7 g/dL    Hematocrit 42.5 40.0 - 53.0 %    MCV 79 78 - 100 fL    MCH 25.4 (L) 26.5 - 33.0 pg    MCHC 32.0 31.5 - 36.5 g/dL    RDW 14.4 10.0 - 15.0 %    Platelet Count 268 150 - 450 10e3/uL    % Neutrophils 64 %    % Lymphocytes 27 %    % Monocytes 5 %    % Eosinophils 3 %    % Basophils 1 %    Absolute Neutrophils 5.4 1.6 - 8.3 10e3/uL    Absolute Lymphocytes 2.3 0.8 - 5.3 10e3/uL    Absolute Monocytes 0.5 0.0 - 1.3 10e3/uL    Absolute Eosinophils 0.3 0.0 - 0.7 10e3/uL    Absolute Basophils 0.0 0.0 - 0.2 10e3/uL       If you have any questions or concerns, please call the clinic at the number listed above.       Sincerely,      Jonah Leroy MD

## 2022-05-17 NOTE — PROGRESS NOTES
Assessment & Plan     Central abdominal pain  Subacute problem.  Possibly due to constipation.  Consider differentials including irritable bowel syndrome with constipation.  Patient also had a 30 pound weight loss in the last 7 months, I believe this is intentional as he recently started a workout plan however I would like to check his additional labs as per below to rule out electrolyte derangements.  Symptoms have been refractory to fiber supplementation.  X-ray with moderate amount of colonic stool, no evidence of bowel obstruction or perforation.  Recommend starting more aggressive bowel regimen, start senna S, increase water intake, if symptoms not improved at the follow-up consider a colonoscopy and a CT abdomen.  - CBC with platelets and differential; Future  - Comprehensive metabolic panel (BMP + Alb, Alk Phos, ALT, AST, Total. Bili, TP); Future  - Lipid panel reflex to direct LDL Fasting; Future  - Hemoglobin A1c; Future  - TSH with free T4 reflex; Future  - XR Abdomen 2 Views; Future  - CBC with platelets and differential  - Comprehensive metabolic panel (BMP + Alb, Alk Phos, ALT, AST, Total. Bili, TP)  - Lipid panel reflex to direct LDL Fasting  - Hemoglobin A1c  - TSH with free T4 reflex  - SENNA-docusate sodium (SENNA S) 8.6-50 MG tablet; Take 1 tablet by mouth At Bedtime     Generalized hyperhidrosis  Check thyroid levels as per above, there is no related flushing and I currently do not suspect carcinoid tumor.  We will continue to monitor, consider dermatology referral if symptoms not improved      Return in about 1 week (around 5/24/2022) for abdominal pain.    Jonah Leroy MD  [facility]    Anai Argueta is a 21 year old who presents for the following health issues     History of Present Illness       Mental Health Follow-up:                    Today's PHQ-9         PHQ-9 Total Score: 0  PHQ-9 Q9 Thoughts of better off dead/self-harm past 2 weeks :   (P) Not at  "all    How difficult have these problems made it for you to do your work, take care of things at home, or get along with other people: Not difficult at all    Today's EVGENY-7 Score: 0    Reason for visit:  Wellness check  Symptom onset:  More than a month    He eats 0-1 servings of fruits and vegetables daily.He consumes 0 sweetened beverage(s) daily.He exercises with enough effort to increase his heart rate 60 or more minutes per day.  He exercises with enough effort to increase his heart rate 5 days per week.   He is taking medications regularly.     Kendall presents with mom, for the last 4 to 6 weeks has noticed increased sweating where he will just be standing and looks drenched.  No associated symptoms at this time.  Additionally continues to have abdominal pain, localized to the mid lower abdomen, not modified by food or activity.  Constantly feels the need to put pressure with his hands over the area however does not have any improvement, no bulging.  Symptoms minimally modified by diet, activity or defecation.  Has 2-3 bowel movements per week, endorses hemorrhoids that come and go.    Recently had EMS come out to [place] as he was having chest discomfort, did not go to the ER as his EKG was normal.    Family history of pancreatic breast and cervical cancer.      Review of Systems   Constitutional: Negative for fever.   Gastrointestinal: Negative for hematochezia, nausea and vomiting.            Objective    /74 (Cuff Size: Adult Large)   Pulse 62   Temp 98.2  F (36.8  C)   Resp 12   Ht 1.702 m (5' 7\")   Wt 100.2 kg (221 lb)   SpO2 98%   BMI 34.61 kg/m    Body mass index is 34.61 kg/m .   Vital Signs 10/4/2021 12/23/2021 1/15/2022 2/24/2022 5/17/2022   Weight (LB) 251 lb 9.6 oz  229 lb  221 lb     Physical Exam  Vitals reviewed.   Constitutional:       Appearance: He is not ill-appearing.   Cardiovascular:      Rate and Rhythm: Normal rate and regular rhythm.   Pulmonary:      Effort: Pulmonary effort " is normal.      Breath sounds: Normal breath sounds.   Abdominal:      General: Abdomen is flat. There is no distension.      Palpations: Abdomen is soft. There is no mass.      Tenderness: There is no abdominal tenderness.      Hernia: No hernia is present.   Skin:     Comments: No visible hyperhidrosis            Results for orders placed or performed in visit on 05/17/22 (from the past 24 hour(s))   CBC with platelets and differential    Narrative    The following orders were created for panel order CBC with platelets and differential.  Procedure                               Abnormality         Status                     ---------                               -----------         ------                     CBC with platelets and d...[[id number]]  Abnormal            Final result                 Please view results for these tests on the individual orders.   Hemoglobin A1c   Result Value Ref Range    Hemoglobin A1C 5.6 0.0 - 5.6 %   CBC with platelets and differential   Result Value Ref Range    WBC Count 8.4 4.0 - 11.0 10e3/uL    RBC Count 5.36 4.40 - 5.90 10e6/uL    Hemoglobin 13.6 13.3 - 17.7 g/dL    Hematocrit 42.5 40.0 - 53.0 %    MCV 79 78 - 100 fL    MCH 25.4 (L) 26.5 - 33.0 pg    MCHC 32.0 31.5 - 36.5 g/dL    RDW 14.4 10.0 - 15.0 %    Platelet Count 268 150 - 450 10e3/uL    % Neutrophils 64 %    % Lymphocytes 27 %    % Monocytes 5 %    % Eosinophils 3 %    % Basophils 1 %    Absolute Neutrophils 5.4 1.6 - 8.3 10e3/uL    Absolute Lymphocytes 2.3 0.8 - 5.3 10e3/uL    Absolute Monocytes 0.5 0.0 - 1.3 10e3/uL    Absolute Eosinophils 0.3 0.0 - 0.7 10e3/uL    Absolute Basophils 0.0 0.0 - 0.2 10e3/uL       Abdominal x-ray, independently reviewed by me, moderate amount of colonic stool suggesting constipation, no free air, no evidence of obstruction.

## 2022-05-18 LAB
ALBUMIN SERPL-MCNC: 3.8 G/DL (ref 3.4–5)
ALP SERPL-CCNC: 119 U/L (ref 40–150)
ALT SERPL W P-5'-P-CCNC: 35 U/L (ref 0–70)
ANION GAP SERPL CALCULATED.3IONS-SCNC: <1 MMOL/L (ref 3–14)
AST SERPL W P-5'-P-CCNC: 26 U/L (ref 0–45)
BILIRUB SERPL-MCNC: 0.2 MG/DL (ref 0.2–1.3)
BUN SERPL-MCNC: 11 MG/DL (ref 7–30)
CALCIUM SERPL-MCNC: 9.3 MG/DL (ref 8.5–10.1)
CHLORIDE BLD-SCNC: 109 MMOL/L (ref 94–109)
CHOLEST SERPL-MCNC: 160 MG/DL
CO2 SERPL-SCNC: 32 MMOL/L (ref 20–32)
CREAT SERPL-MCNC: 0.93 MG/DL (ref 0.66–1.25)
FASTING STATUS PATIENT QL REPORTED: YES
GFR SERPL CREATININE-BSD FRML MDRD: >90 ML/MIN/1.73M2
GLUCOSE BLD-MCNC: 82 MG/DL (ref 70–99)
HDLC SERPL-MCNC: 46 MG/DL
LDLC SERPL CALC-MCNC: 101 MG/DL
NONHDLC SERPL-MCNC: 114 MG/DL
POTASSIUM BLD-SCNC: 4.1 MMOL/L (ref 3.4–5.3)
PROT SERPL-MCNC: 6.8 G/DL (ref 6.8–8.8)
SODIUM SERPL-SCNC: 138 MMOL/L (ref 133–144)
TRIGL SERPL-MCNC: 63 MG/DL
TSH SERPL DL<=0.005 MIU/L-ACNC: 1.03 MU/L (ref 0.4–4)

## 2022-05-18 ASSESSMENT — PATIENT HEALTH QUESTIONNAIRE - PHQ9: SUM OF ALL RESPONSES TO PHQ QUESTIONS 1-9: 0

## 2022-05-18 ASSESSMENT — ANXIETY QUESTIONNAIRES: GAD7 TOTAL SCORE: 0

## 2022-05-25 ENCOUNTER — TELEPHONE (OUTPATIENT)
Dept: FAMILY MEDICINE | Facility: CLINIC | Age: 22
End: 2022-05-25
Payer: COMMERCIAL

## 2022-05-25 ENCOUNTER — OFFICE VISIT (OUTPATIENT)
Dept: FAMILY MEDICINE | Facility: CLINIC | Age: 22
End: 2022-05-25
Payer: COMMERCIAL

## 2022-05-25 VITALS
SYSTOLIC BLOOD PRESSURE: 122 MMHG | WEIGHT: 223.3 LBS | BODY MASS INDEX: 35.05 KG/M2 | OXYGEN SATURATION: 97 % | HEIGHT: 67 IN | TEMPERATURE: 98.2 F | DIASTOLIC BLOOD PRESSURE: 74 MMHG | HEART RATE: 70 BPM | RESPIRATION RATE: 18 BRPM

## 2022-05-25 DIAGNOSIS — G89.29 CHRONIC ABDOMINAL PAIN: Primary | ICD-10-CM

## 2022-05-25 DIAGNOSIS — R10.9 CHRONIC ABDOMINAL PAIN: Primary | ICD-10-CM

## 2022-05-25 PROCEDURE — 99214 OFFICE O/P EST MOD 30 MIN: CPT | Performed by: FAMILY MEDICINE

## 2022-05-25 ASSESSMENT — ENCOUNTER SYMPTOMS
NAUSEA: 1
VOMITING: 0
ABDOMINAL PAIN: 1

## 2022-05-25 NOTE — PROGRESS NOTES
"  Assessment & Plan     Chronic abdominal pain  Initially I thought this was subacute problem however talking with Kendall and his mom, Maria Isabel more about this it sounds like its been going on for about a year.  Red flag symptoms include unintentional weight loss of 30 pounds in the last half a year. For additional information see encounter note dated 5/17/2022.  Lab work-up has been unremarkable for diabetes, thyroid disorder no electrolyte derangements normal CBC.  X-ray abdomen performed on previous visit with increased stool burden, limited improvement with senna S.  At this point I recommend he pursue CT abdomen with and without contrast as he is quite concerned for a patient palpable localized mass, again I cannot palpate this on exam.  Additionally, recommend getting a diagnostic colonoscopy.  If CT and x-ray negative, consider IBS-C, could trial Linzess at that time.   - Adult Gastro Ref - Procedure Only  - CT Abdomen Pelvis w/o & w Contrast      Return in about 1 month (around 6/25/2022) for abdominal pain followup. .    Jonah Leroy MD  [facility]   Kendall is a 21 year old who presents for the following health issues     HPI     Patient presents for follow-up abdominal pain, some improvement after senna S, 2 bowel movements, bloating has mildly improved however he continuously feels that there is movement or a mass in his abdomen; which he constantly presses over the region over and over again to try to get relief however is not helpful.      Review of Systems   Gastrointestinal: Positive for abdominal pain and nausea. Negative for vomiting.            Objective    /74 (BP Location: Right arm, Patient Position: Sitting, Cuff Size: Adult Regular)   Pulse 70   Temp 98.2  F (36.8  C) (Oral)   Resp 18   Ht 1.702 m (5' 7\")   Wt 101.3 kg (223 lb 4.8 oz)   SpO2 97%   BMI 34.97 kg/m    Body mass index is 34.97 kg/m .  Physical Exam  Vitals reviewed.   Constitutional:  "      Appearance: He is not ill-appearing.   Cardiovascular:      Rate and Rhythm: Normal rate and regular rhythm.   Pulmonary:      Effort: Pulmonary effort is normal.      Breath sounds: Normal breath sounds.   Abdominal:      General: Abdomen is flat. There is no distension.      Palpations: Abdomen is soft. There is no mass.      Tenderness: There is no abdominal tenderness.

## 2022-05-25 NOTE — TELEPHONE ENCOUNTER
Lukas Nicolas ([facility] on file) called. Requesting phone number to call for orders placed today for CT scan and colonoscopy. Scheduling numbers given, she had no further questions or concerns at this time other than wanting to get imaging scheduled asap.     Lita Brooks R.N.

## 2022-06-01 ENCOUNTER — HOSPITAL ENCOUNTER (OUTPATIENT)
Dept: CT IMAGING | Facility: CLINIC | Age: 22
Discharge: HOME | End: 2022-06-01
Attending: FAMILY MEDICINE | Admitting: FAMILY MEDICINE
Payer: COMMERCIAL

## 2022-06-01 DIAGNOSIS — G89.29 CHRONIC ABDOMINAL PAIN: ICD-10-CM

## 2022-06-01 DIAGNOSIS — R10.9 CHRONIC ABDOMINAL PAIN: ICD-10-CM

## 2022-06-01 PROCEDURE — 74177 CT ABD & PELVIS W/CONTRAST: CPT

## 2022-06-01 PROCEDURE — 250N000011 HC RX IP 250 OP 636: Performed by: RADIOLOGY

## 2022-06-01 PROCEDURE — 250N000009 HC RX 250: Performed by: RADIOLOGY

## 2022-06-01 RX ORDER — IOPAMIDOL 755 MG/ML
500 INJECTION, SOLUTION INTRAVASCULAR ONCE
Status: COMPLETED | OUTPATIENT
Start: 2022-06-01 | End: 2022-06-01

## 2022-06-01 RX ADMIN — SODIUM CHLORIDE 65 ML: 9 INJECTION, SOLUTION INTRAVENOUS at 09:44

## 2022-06-01 RX ADMIN — IOPAMIDOL 100 ML: 755 INJECTION, SOLUTION INTRAVENOUS at 09:43

## 2022-06-06 ENCOUNTER — TELEPHONE (OUTPATIENT)
Dept: FAMILY MEDICINE | Facility: CLINIC | Age: 22
End: 2022-06-06
Payer: COMMERCIAL

## 2022-06-06 DIAGNOSIS — K30 FUNCTIONAL DYSPEPSIA: Primary | ICD-10-CM

## 2022-06-06 RX ORDER — OMEPRAZOLE 40 MG/1
CAPSULE, DELAYED RELEASE ORAL
Qty: 30 CAPSULE | Refills: 1 | Status: SHIPPED | OUTPATIENT
Start: 2022-06-06 | End: 2023-03-30

## 2022-06-06 NOTE — TELEPHONE ENCOUNTER
I attempted to place a phone call to review CT abdomen and EGD results, no answer.  CT studies with possible fatty liver disease.    Likely diagnosis, functional dyspepsia.  Start Prilosec, RX sent.  Has follow-up with me 6-.    Jonah Leroy MD

## 2022-06-21 ENCOUNTER — TELEPHONE (OUTPATIENT)
Dept: FAMILY MEDICINE | Facility: CLINIC | Age: 22
End: 2022-06-21
Payer: COMMERCIAL

## 2022-06-21 NOTE — TELEPHONE ENCOUNTER
Call to Kendall I discussed all of below with Kendall and asked him to inform his mother that discussion was had regarding results with Dr. Leroy during previous discussion.     Names of OTC medication given, advised to start those and see if it provides any relief to his symptoms. He had no further questions at this time. Lita Brooks R.N.

## 2022-06-21 NOTE — TELEPHONE ENCOUNTER
Mom Maria Isabel calling says Kendall should be having appointment now but didn't come because Kendall got hurt last night and they went to urgent care and it was closed,  and is in too much pain to come in. Would not elaborate as wanted to get off phone. Says all Kendall needs is Dr. Leroy  to call him with MRI results.     I do see CT results were documented but appears were not routed for staff to call patient. I called back and spoke to Kendall results given. He said Dr. Leroy discussed with him starting some medications for IBSC. He would like to proceed with that.      Dr. Leroy please advise.     Lita Brooks R.N.

## 2022-06-21 NOTE — TELEPHONE ENCOUNTER
Yes, I have already called and discussed the results with Kendall.  He probably just did not tell his mom.  Would recommend starting him on fiber supplementation and enteric coated peppermint oil for suspected irritable bowel syndrome.      Both are available over-the-counter, recommend starting Metamucil and IBgard.    Thank you,    Jonah Leroy MD

## 2022-06-21 NOTE — TELEPHONE ENCOUNTER
"Mom Maria Isabel called back and left a message saying thank you for me reaching back out to Reed. Did report Kendall was seen yesterday at   Urgent care since ours was closed for the injury she referred to earlier.     Maria Isabel expressed a lot of frustration over the fact that the appointment missed was supposed to give information about results and moving forward hopefully with medication.    Wants medication for Reed, says they were dealing with other issues and that is why he did not come to appointment today. Wants Dr. Leroy to call Kendall today and wants him to \"put him back on a medication like what he was on prior. We were hoping for negative results so they can put him on something like he was just on. This is an ongoing edmondson, the pills he was on before randall seemed like they worked more than not being on anything at all. \"    Is upset that Dr. Leroy has not reached out yet to Reed this morning. (despite missed scheduled appointment to discuss these issues, and message left minutes after RN calling Kendall to discuss results) So she is asking he reaches out directly to Kendall sometime today.     Lita Brooks R.N.    "

## 2022-07-08 ENCOUNTER — OFFICE VISIT (OUTPATIENT)
Dept: URGENT CARE | Facility: URGENT CARE | Age: 22
End: 2022-07-08
Payer: COMMERCIAL

## 2022-07-08 VITALS
HEART RATE: 63 BPM | TEMPERATURE: 98.2 F | SYSTOLIC BLOOD PRESSURE: 120 MMHG | OXYGEN SATURATION: 100 % | WEIGHT: 215.7 LBS | RESPIRATION RATE: 18 BRPM | BODY MASS INDEX: 33.78 KG/M2 | DIASTOLIC BLOOD PRESSURE: 80 MMHG

## 2022-07-08 DIAGNOSIS — S60.10XA SUBUNGUAL HEMATOMA OF FINGER OF LEFT HAND, INITIAL ENCOUNTER: ICD-10-CM

## 2022-07-08 DIAGNOSIS — S69.92XA FINGER INJURY, LEFT, INITIAL ENCOUNTER: Primary | ICD-10-CM

## 2022-07-08 PROCEDURE — 99213 OFFICE O/P EST LOW 20 MIN: CPT | Performed by: FAMILY MEDICINE
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
## 2023-03-23 ENCOUNTER — HOSPITAL ENCOUNTER (EMERGENCY)
Facility: CLINIC | Age: 23
Discharge: HOME | End: 2023-03-23
Attending: EMERGENCY MEDICINE | Admitting: EMERGENCY MEDICINE
Payer: COMMERCIAL

## 2023-03-23 VITALS
SYSTOLIC BLOOD PRESSURE: 144 MMHG | TEMPERATURE: 98 F | DIASTOLIC BLOOD PRESSURE: 74 MMHG | HEART RATE: 110 BPM | OXYGEN SATURATION: 100 % | RESPIRATION RATE: 16 BRPM

## 2023-03-23 DIAGNOSIS — S61.401A OPEN WOUND OF RIGHT HAND, FOREIGN BODY PRESENCE UNSPECIFIED, UNSPECIFIED WOUND TYPE, INITIAL ENCOUNTER: ICD-10-CM

## 2023-03-23 DIAGNOSIS — R22.9 MULTIPLE SKIN NODULES: ICD-10-CM

## 2023-03-23 PROCEDURE — 99285 EMERGENCY DEPT VISIT HI MDM: CPT

## 2023-03-23 ASSESSMENT — ACTIVITIES OF DAILY LIVING (ADL): ADLS_ACUITY_SCORE: 35
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
## 2023-03-23 NOTE — DISCHARGE INSTRUCTIONS
You left the department prior to getting your wound redressed or getting blood cultures drawn.   knee
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
## 2023-03-23 NOTE — ED NOTES
"While assessing the Pt. I asked him about his skin peeling. Pt became very offended and started to belittle the me. \"Why would you ask a retarded question. You have a brain. Don't ask retarded questions.\" Pt then went on to describe me as a \"shit person with a shit attitude.\" Nurse did not respond to this. I did take the Pt's vitals.   "

## 2023-03-23 NOTE — ED NOTES
"Estimated 1-2 minutes after Dr. Mcdaniel told the Pt the plan. The Pt's mom came out of the room and was demanding a bandaid for her son's hand. I did inform her that I would be in shortly. I was also trying to explain that blood cultures were going to be drawn. Pt did not want to listen/give me the chance to talk to her. Her and her son (the Pt) then started walking down the [place] saying they were leaving. Dr. Mcdaniel was aware, Pt's mom was screaming \"you're a fucking awful person\" at me, Godfrey DEL ROSARIO. Code 21 was called due to mom screaming.     Please see previous note about Pt's additional behavior.   "

## 2023-03-30 ENCOUNTER — TELEPHONE (OUTPATIENT)
Dept: ORTHOPEDICS | Facility: CLINIC | Age: 23
End: 2023-03-30

## 2023-03-30 ENCOUNTER — ANCILLARY PROCEDURE (OUTPATIENT)
Dept: GENERAL RADIOLOGY | Facility: CLINIC | Age: 23
End: 2023-03-30
Attending: FAMILY MEDICINE
Payer: COMMERCIAL

## 2023-03-30 ENCOUNTER — OFFICE VISIT (OUTPATIENT)
Dept: FAMILY MEDICINE | Facility: CLINIC | Age: 23
End: 2023-03-30
Payer: COMMERCIAL

## 2023-03-30 VITALS
DIASTOLIC BLOOD PRESSURE: 76 MMHG | SYSTOLIC BLOOD PRESSURE: 128 MMHG | WEIGHT: 182 LBS | TEMPERATURE: 98.2 F | BODY MASS INDEX: 28.56 KG/M2 | HEIGHT: 67 IN | OXYGEN SATURATION: 99 % | RESPIRATION RATE: 18 BRPM | HEART RATE: 80 BPM

## 2023-03-30 DIAGNOSIS — F32.0 MILD MAJOR DEPRESSION (H): ICD-10-CM

## 2023-03-30 DIAGNOSIS — M79.641 PAIN OF RIGHT HAND: Primary | ICD-10-CM

## 2023-03-30 DIAGNOSIS — L03.90 CELLULITIS, UNSPECIFIED CELLULITIS SITE: ICD-10-CM

## 2023-03-30 DIAGNOSIS — S69.91XD HAND INJURY, RIGHT, SUBSEQUENT ENCOUNTER: ICD-10-CM

## 2023-03-30 DIAGNOSIS — D57.3 SICKLE CELL TRAIT (H): ICD-10-CM

## 2023-03-30 PROBLEM — E66.01 MORBID OBESITY (H): Status: RESOLVED | Noted: 2021-10-04 | Resolved: 2023-03-30

## 2023-03-30 PROCEDURE — 99214 OFFICE O/P EST MOD 30 MIN: CPT | Performed by: FAMILY MEDICINE

## 2023-03-30 PROCEDURE — 73130 X-RAY EXAM OF HAND: CPT | Mod: TC | Performed by: RADIOLOGY

## 2023-03-30 RX ORDER — SULFAMETHOXAZOLE/TRIMETHOPRIM 800-160 MG
1 TABLET ORAL 2 TIMES DAILY
Qty: 14 TABLET | Refills: 0 | Status: SHIPPED | OUTPATIENT
Start: 2023-03-30 | End: 2023-04-07

## 2023-03-30 ASSESSMENT — ENCOUNTER SYMPTOMS
FEVER: 0
NERVOUS/ANXIOUS: 1
AGITATION: 0
ABDOMINAL DISTENTION: 0
HEADACHES: 0
FATIGUE: 0
DIFFICULTY URINATING: 0
ARTHRALGIAS: 1
CONFUSION: 0
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
## 2023-03-30 NOTE — LETTER
March 30, 2023      Darwin RAMIREZ Perez  [address] [facility] [place] [address]        To Whom It May Concern:    Darwin Todd was seen in our clinic.     Please allow patient to have dog .  Dog helps him as an emotional support animal and assist him in managing health concern .      Sincerely,        Ghazal Jeffries MD

## 2023-03-31 ENCOUNTER — PRE VISIT (OUTPATIENT)
Dept: ORTHOPEDICS | Facility: CLINIC | Age: 23
End: 2023-03-31

## 2023-04-07 ENCOUNTER — OFFICE VISIT (OUTPATIENT)
Dept: FAMILY MEDICINE | Facility: CLINIC | Age: 23
End: 2023-04-07
Payer: COMMERCIAL

## 2023-04-07 VITALS
BODY MASS INDEX: 29.24 KG/M2 | TEMPERATURE: 97.7 F | HEART RATE: 77 BPM | HEIGHT: 67 IN | WEIGHT: 186.3 LBS | OXYGEN SATURATION: 98 % | RESPIRATION RATE: 16 BRPM | SYSTOLIC BLOOD PRESSURE: 137 MMHG | DIASTOLIC BLOOD PRESSURE: 77 MMHG

## 2023-04-07 DIAGNOSIS — L03.90 CELLULITIS, UNSPECIFIED CELLULITIS SITE: ICD-10-CM

## 2023-04-07 PROCEDURE — 99213 OFFICE O/P EST LOW 20 MIN: CPT | Performed by: FAMILY MEDICINE

## 2023-04-07 RX ORDER — SULFAMETHOXAZOLE/TRIMETHOPRIM 800-160 MG
1 TABLET ORAL 2 TIMES DAILY
Qty: 14 TABLET | Refills: 0 | Status: SHIPPED | OUTPATIENT
Start: 2023-04-07 | End: 2023-04-14

## 2023-04-07 RX ORDER — QUETIAPINE FUMARATE 200 MG/1
1 TABLET, FILM COATED ORAL AT BEDTIME
COMMUNITY
Start: 2023-03-21 | End: 2024-07-26

## 2023-04-07 ASSESSMENT — PATIENT HEALTH QUESTIONNAIRE - PHQ9
10. IF YOU CHECKED OFF ANY PROBLEMS, HOW DIFFICULT HAVE THESE PROBLEMS MADE IT FOR YOU TO DO YOUR WORK, TAKE CARE OF THINGS AT HOME, OR GET ALONG WITH OTHER PEOPLE: NOT DIFFICULT AT ALL
SUM OF ALL RESPONSES TO PHQ QUESTIONS 1-9: 2
SUM OF ALL RESPONSES TO PHQ QUESTIONS 1-9: 2

## 2023-04-07 ASSESSMENT — PAIN SCALES - GENERAL: PAINLEVEL: NO PAIN (0)
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
## 2023-04-07 NOTE — LETTER
April 7, 2023      Darwin Todd  [address] [address] [address]        To Whom It May Concern:    Darwin Todd was seen in our clinic. He may return to work without restrictions.    Patient is able to perform       Sincerely,        Ghazal Jeffries MD

## 2023-04-13 ENCOUNTER — OFFICE VISIT (OUTPATIENT)
Dept: FAMILY MEDICINE | Facility: CLINIC | Age: 23
End: 2023-04-13
Payer: COMMERCIAL

## 2023-04-13 VITALS
SYSTOLIC BLOOD PRESSURE: 138 MMHG | DIASTOLIC BLOOD PRESSURE: 88 MMHG | OXYGEN SATURATION: 98 % | BODY MASS INDEX: 29.19 KG/M2 | HEIGHT: 67 IN | TEMPERATURE: 98.2 F | RESPIRATION RATE: 18 BRPM | HEART RATE: 96 BPM | WEIGHT: 186 LBS

## 2023-04-13 DIAGNOSIS — M79.641 PAIN OF RIGHT HAND: Primary | ICD-10-CM

## 2023-04-13 PROCEDURE — 99207 PR NO BILLABLE SERVICE THIS VISIT: CPT | Performed by: FAMILY MEDICINE

## 2023-05-27 ENCOUNTER — HOSPITAL ENCOUNTER (OUTPATIENT)
Dept: MRI IMAGING | Facility: CLINIC | Age: 23
Discharge: HOME | End: 2023-05-27
Attending: FAMILY MEDICINE | Admitting: FAMILY MEDICINE
Payer: COMMERCIAL

## 2023-05-27 PROCEDURE — A9585 GADOBUTROL INJECTION: HCPCS | Performed by: FAMILY MEDICINE

## 2023-05-27 PROCEDURE — 73220 MRI UPPR EXTREMITY W/O&W/DYE: CPT | Mod: RT

## 2023-05-27 PROCEDURE — 255N000002 HC RX 255 OP 636: Performed by: FAMILY MEDICINE

## 2023-05-27 RX ORDER — GADOBUTROL 604.72 MG/ML
9 INJECTION INTRAVENOUS ONCE
Status: COMPLETED | OUTPATIENT
Start: 2023-05-27 | End: 2023-05-27

## 2023-05-27 RX ADMIN — GADOBUTROL 9 ML: 604.72 INJECTION INTRAVENOUS at 09:42

## 2023-06-01 ENCOUNTER — TELEPHONE (OUTPATIENT)
Dept: FAMILY MEDICINE | Facility: CLINIC | Age: 23
End: 2023-06-01
Payer: COMMERCIAL

## 2023-06-01 DIAGNOSIS — M79.641 PAIN OF RIGHT HAND: Primary | ICD-10-CM

## 2023-06-01 NOTE — TELEPHONE ENCOUNTER
----- Message from Ghazal Jeffries MD sent at 5/31/2023  9:14 PM CDT -----  Team     Please inform patient that synovitis inflammation in the hand ligament noticed .    -Recommend hand surgeon consultation.  Once agreed consult can be placed.    Thanks   Ghazal Jeffries MD.

## 2023-06-01 NOTE — TELEPHONE ENCOUNTER
Spoke with pt who is in agreement with referral. Please place referral. Copy of report placed at  for pt pickup per request from pt.    Advised pt should receive call within 1-2 business days to schedule and if has not heard to call clinic for phone number to call and schedule.    Candy JAMES RN

## 2023-06-29 ENCOUNTER — TELEPHONE (OUTPATIENT)
Dept: ORTHOPEDICS | Facility: CLINIC | Age: 23
End: 2023-06-29

## 2023-06-29 NOTE — TELEPHONE ENCOUNTER
Patient is scheduled for an appt with Dr. Yeo on 7/19/23 for right hand pain. Upon chart review, patient was referred to hand / ortho surgery for further evaluation. Huddled with Dr. Yeo. Agrees with recommendation for referral to hand surgery - Dr. Ng for best evaluation.    Phone call to patient to discuss. No answer. VM not set up - unable to leave message. Will try back later.    Phone Number patient can be reached at:  Cell number on file:    Telephone Information:   Mobile [phone]     Can we leave a detailed message on this number:  YES    Carmen Lam MBA, ATC

## 2023-06-30 NOTE — TELEPHONE ENCOUNTER
Called and spoke with patient. Informed patient of recommendations below. He verbalized understanding. Appt scheduled with Dr. Ng on 8/1/23. He had no further questions.    Carmen Lam MBA, ATC
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
## 2023-08-01 ENCOUNTER — OFFICE VISIT (OUTPATIENT)
Dept: ORTHOPEDICS | Facility: CLINIC | Age: 23
End: 2023-08-01
Payer: COMMERCIAL

## 2023-08-01 VITALS
HEIGHT: 67 IN | SYSTOLIC BLOOD PRESSURE: 120 MMHG | DIASTOLIC BLOOD PRESSURE: 70 MMHG | WEIGHT: 185.7 LBS | BODY MASS INDEX: 29.15 KG/M2

## 2023-08-01 DIAGNOSIS — M79.641 RIGHT HAND PAIN: Primary | ICD-10-CM

## 2023-08-01 PROCEDURE — 99203 OFFICE O/P NEW LOW 30 MIN: CPT | Performed by: STUDENT IN AN ORGANIZED HEALTH CARE EDUCATION/TRAINING PROGRAM
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
## 2023-08-04 ENCOUNTER — ANCILLARY PROCEDURE (OUTPATIENT)
Dept: GENERAL RADIOLOGY | Facility: CLINIC | Age: 23
End: 2023-08-04
Attending: PHYSICIAN ASSISTANT
Payer: COMMERCIAL

## 2023-08-04 ENCOUNTER — OFFICE VISIT (OUTPATIENT)
Dept: URGENT CARE | Facility: URGENT CARE | Age: 23
End: 2023-08-04
Payer: OTHER MISCELLANEOUS

## 2023-08-04 VITALS
OXYGEN SATURATION: 95 % | SYSTOLIC BLOOD PRESSURE: 121 MMHG | BODY MASS INDEX: 29.35 KG/M2 | HEIGHT: 67 IN | DIASTOLIC BLOOD PRESSURE: 59 MMHG | TEMPERATURE: 98.3 F | RESPIRATION RATE: 16 BRPM | WEIGHT: 187 LBS | HEART RATE: 87 BPM

## 2023-08-04 DIAGNOSIS — M25.571 ACUTE RIGHT ANKLE PAIN: ICD-10-CM

## 2023-08-04 DIAGNOSIS — S93.491A SPRAIN OF ANTERIOR TALOFIBULAR LIGAMENT OF RIGHT ANKLE, INITIAL ENCOUNTER: Primary | ICD-10-CM

## 2023-08-04 PROCEDURE — 99213 OFFICE O/P EST LOW 20 MIN: CPT | Performed by: PHYSICIAN ASSISTANT

## 2023-08-04 PROCEDURE — 73610 X-RAY EXAM OF ANKLE: CPT | Mod: TC | Performed by: RADIOLOGY

## 2023-08-04 ASSESSMENT — ENCOUNTER SYMPTOMS
JOINT SWELLING: 1
CARDIOVASCULAR NEGATIVE: 1
RESPIRATORY NEGATIVE: 1

## 2023-08-04 NOTE — LETTER
August 4, 2023      Darwin Todd  [address] [facility] [address]        To Whom It May Concern:    Darwin Todd  was seen on 8/4/2023.  Please excuse him  until 8/4/2023 due to injury.        Sincerely,        Jessica Ruby PA-C

## 2023-08-04 NOTE — LETTER
REPORT OF WORK COMP    [facility]  [address]  [facility] [address]  [phone]      PATIENT DATA    Employee Name: Darwin Todd      : 2000     #: [id number]    Work related injury: Yes  Employer at time of injury: [employer]    Today's date: 2023  Date of injury: 8/3/2023  Date of first visit: 2023    PROVIDER EVALUATION: Please fill in as needed.  Please give copy to employee for employer.    1. Diagnosis: Ankle sprain    2. Treatment: Ankle bracing and ice.  3. Medication: Ibuprofen  NOTE: When ordering a medication, [place] Rules require Work Comp or WC on prescriptions.    4. No work from 2023 to 2023.  5. Return to work date: 2023   ** WITH RESTRICTIONS? Yes, with work restrictions: * Sitting/standing: Sitting/hours per day: 7;  Standing/hours per day: 0.  DURATION OF LIMITATIONS: 2023 - 2023      RESTRICTIONS: Unlimited unless listed.  Restrictions apply to home and leisure also.  If work restrictions is not available, the employee is totally disabled.    Medical Examiner: Jessica Ruby PA-C              CC: Employer, Managed Care Plan/Payor, Patient

## 2023-08-04 NOTE — PROGRESS NOTES
Assessment & Plan:        ICD-10-CM    1. Sprain of anterior talofibular ligament of right ankle, initial encounter  S93.491A Ankle/Foot Bracing Supplies DME Ankle Brace; Right      2. Acute right ankle pain  M25.571 XR Ankle Right G/E 3 Views     Ankle/Foot Bracing Supplies DME Ankle Brace; Right            Plan/Clinical Decision Making:  Patient reports to the clinic with right ankle pain that started yesterday. Patient was at work when he twisted his ankle in a divot and heard a crack. Pain and swelling right lateral malleolus area.  I independently visualized the xray: and is negative for any fractures. Patient has an ankle sprain and will be given a brace for the injury.   Work note with restrictions given.   Patient was informed to take Ibuprofen and use ice as needed. Patient is to return if symptoms worsen.         Return if symptoms worsen or fail to improve, for in 5-7 days.     At the end of the encounter, I discussed results, diagnosis, medications. Discussed red flags for immediate return to clinic/ER, as well as indications for follow up if no improvement. Patient understood and agreed to plan. Patient was stable for discharge.        Seen with student: SIDDHARTHA Moran    Physician Attestation   I, Jessica Ruby PA-C, was present with the medical/KT student who participated in the service and in the documentation of the note.  I have verified the history and personally performed the physical exam and medical decision making.  I agree with the assessment and plan of care as documented in the note.        Jessica Ruby PA-C      Subjective:     HPI:    Kendall is a 23 year old male who presents to clinic today for the following health issues:  Chief Complaint   Patient presents with    Urgent Care     Right ankle heard a crack. Twisted it at work yesterday. Iced at home and this morning     HPI  Patient reports yesterday fell in a divot and twisted his right ankle. Patient reports pain,  "swelling, no bruising. Patient did it at work, he works for [employer]. Patient reports he heard and felt a crack     History obtained from the patient.    Review of Systems   Respiratory: Negative.     Cardiovascular: Negative.    Musculoskeletal:  Positive for joint swelling.        Pain in ankle of right foot. Pain more on lateral side but pain on medial as well.    Skin: Negative.          Patient Active Problem List   Diagnosis    Prediabetes    Hyperlipidemia LDL goal <100    Sickle cell trait (H)    Mild major depression (H)    Anxiety    Marijuana use    History of iron deficiency anemia    Environmental allergies    Raynaud's disease without gangrene        Past Medical History:   Diagnosis Date    Anxiety     Environmental allergies     Hyperlipidemia LDL goal <100     Marijuana use     Mild major depression (H)     Prediabetes     Sickle cell trait (H)        Social History     Tobacco Use    Smoking status: Former     Packs/day: 0.25     Types: Cigarettes    Smokeless tobacco: Never    Tobacco comments:      quit 1/2018   Substance Use Topics    Alcohol use: Yes     Comment: 1-5 per year             Objective:     Vitals:    08/04/23 1230   BP: 121/59   Pulse: 87   Resp: 16   Temp: 98.3  F (36.8  C)   TempSrc: Tympanic   SpO2: 95%   Weight: 84.8 kg (187 lb)   Height: 1.702 m (5' 7\")         Physical Exam   EXAM:   Pleasant, alert, appropriate appearance. NAD.  Chest/Respiratory Exam: CTAB.  Cardiovascular Exam: RRR. No murmur or rubs.  Ext/musculoskeletal: Grossly intact. Patient has pain with extension and inversion of right ankle. Pulses intact.   Patient has pain and swelling of his right ankle, more prominent on the lateral side. Patient has pain with extension and inversion of right ankle. Patient has intact sensation and pulses of right lower extremity.  Neuro: CN II-XII intact grossly intact. Full sensation of toes and ankle  Skin: no rash or lesion.      Results:  Results for orders placed or " performed in visit on 08/04/23   XR Ankle Right G/E 3 Views     Status: None    Narrative    ANKLE THREE VIEWS RIGHT  8/4/2023 1:17 PM     HISTORY: Acute right ankle pain  COMPARISON: None.      Impression    IMPRESSION: Soft tissue swelling over the lateral malleolus. No acute  fracture is identified. Tiny calcification adjacent to the lateral  malleolus laterally. There is normal joint spacing and alignment. The  ankle mortise is congruent. The talar dome is unremarkable.    RAUL LI MD         SYSTEM ID:  [id number]

## 2024-02-18 ENCOUNTER — HOSPITAL ENCOUNTER (EMERGENCY)
Facility: CLINIC | Age: 24
Discharge: HOME | End: 2024-02-18
Attending: EMERGENCY MEDICINE | Admitting: EMERGENCY MEDICINE
Payer: COMMERCIAL

## 2024-02-18 VITALS
HEART RATE: 95 BPM | RESPIRATION RATE: 17 BRPM | TEMPERATURE: 98 F | OXYGEN SATURATION: 94 % | BODY MASS INDEX: 31.08 KG/M2 | SYSTOLIC BLOOD PRESSURE: 138 MMHG | DIASTOLIC BLOOD PRESSURE: 84 MMHG | HEIGHT: 67 IN | WEIGHT: 198 LBS

## 2024-02-18 DIAGNOSIS — F12.90 MARIJUANA USE: ICD-10-CM

## 2024-02-18 DIAGNOSIS — R00.2 PALPITATIONS: ICD-10-CM

## 2024-02-18 DIAGNOSIS — R07.89 CHEST TIGHTNESS: ICD-10-CM

## 2024-02-18 LAB
ALBUMIN SERPL BCG-MCNC: 4.7 G/DL (ref 3.5–5.2)
ALP SERPL-CCNC: 130 U/L (ref 40–150)
ALT SERPL W P-5'-P-CCNC: 36 U/L (ref 0–70)
AMPHETAMINES UR QL SCN: ABNORMAL
ANION GAP SERPL CALCULATED.3IONS-SCNC: 15 MMOL/L (ref 7–15)
AST SERPL W P-5'-P-CCNC: 35 U/L (ref 0–45)
ATRIAL RATE - MUSE: 126 BPM
BARBITURATES UR QL SCN: ABNORMAL
BASOPHILS # BLD AUTO: 0.1 10E3/UL (ref 0–0.2)
BASOPHILS NFR BLD AUTO: 1 %
BENZODIAZ UR QL SCN: ABNORMAL
BILIRUB SERPL-MCNC: <0.2 MG/DL
BUN SERPL-MCNC: 15.2 MG/DL (ref 6–20)
BZE UR QL SCN: ABNORMAL
CALCIUM SERPL-MCNC: 9.4 MG/DL (ref 8.6–10)
CANNABINOIDS UR QL SCN: ABNORMAL
CHLORIDE SERPL-SCNC: 101 MMOL/L (ref 98–107)
CREAT SERPL-MCNC: 1.04 MG/DL (ref 0.67–1.17)
CREAT UR-MCNC: 32 MG/DL
DEPRECATED HCO3 PLAS-SCNC: 24 MMOL/L (ref 22–29)
DIASTOLIC BLOOD PRESSURE - MUSE: NORMAL MMHG
EGFRCR SERPLBLD CKD-EPI 2021: >90 ML/MIN/1.73M2
EOSINOPHIL # BLD AUTO: 0.3 10E3/UL (ref 0–0.7)
EOSINOPHIL NFR BLD AUTO: 3 %
ERYTHROCYTE [DISTWIDTH] IN BLOOD BY AUTOMATED COUNT: 13.1 % (ref 10–15)
ETHANOL SERPL-MCNC: 0.12 G/DL
FENTANYL UR QL: ABNORMAL
GLUCOSE SERPL-MCNC: 127 MG/DL (ref 70–99)
HCT VFR BLD AUTO: 46.7 % (ref 40–53)
HGB BLD-MCNC: 14.9 G/DL (ref 13.3–17.7)
HOLD SPECIMEN: NORMAL
IMM GRANULOCYTES # BLD: 0 10E3/UL
IMM GRANULOCYTES NFR BLD: 0 %
INTERPRETATION ECG - MUSE: NORMAL
LYMPHOCYTES # BLD AUTO: 3.8 10E3/UL (ref 0.8–5.3)
LYMPHOCYTES NFR BLD AUTO: 35 %
MCH RBC QN AUTO: 25.5 PG (ref 26.5–33)
MCHC RBC AUTO-ENTMCNC: 31.9 G/DL (ref 31.5–36.5)
MCV RBC AUTO: 80 FL (ref 78–100)
MONOCYTES # BLD AUTO: 0.6 10E3/UL (ref 0–1.3)
MONOCYTES NFR BLD AUTO: 6 %
NEUTROPHILS # BLD AUTO: 6.1 10E3/UL (ref 1.6–8.3)
NEUTROPHILS NFR BLD AUTO: 55 %
NRBC # BLD AUTO: 0 10E3/UL
NRBC BLD AUTO-RTO: 0 /100
OPIATES UR QL SCN: ABNORMAL
P AXIS - MUSE: 43 DEGREES
PCP QUAL URINE (ROCHE): ABNORMAL
PLATELET # BLD AUTO: 327 10E3/UL (ref 150–450)
POTASSIUM SERPL-SCNC: 3.6 MMOL/L (ref 3.4–5.3)
PR INTERVAL - MUSE: 170 MS
PROT SERPL-MCNC: 8 G/DL (ref 6.4–8.3)
QRS DURATION - MUSE: 84 MS
QT - MUSE: 266 MS
QTC - MUSE: 385 MS
R AXIS - MUSE: 73 DEGREES
RBC # BLD AUTO: 5.84 10E6/UL (ref 4.4–5.9)
SODIUM SERPL-SCNC: 140 MMOL/L (ref 135–145)
SYSTOLIC BLOOD PRESSURE - MUSE: NORMAL MMHG
T AXIS - MUSE: 34 DEGREES
TROPONIN T SERPL HS-MCNC: <6 NG/L
TSH SERPL DL<=0.005 MIU/L-ACNC: 2.31 UIU/ML (ref 0.3–4.2)
VENTRICULAR RATE- MUSE: 126 BPM
WBC # BLD AUTO: 10.9 10E3/UL (ref 4–11)

## 2024-02-18 PROCEDURE — 96374 THER/PROPH/DIAG INJ IV PUSH: CPT

## 2024-02-18 PROCEDURE — 258N000003 HC RX IP 258 OP 636: Performed by: EMERGENCY MEDICINE

## 2024-02-18 PROCEDURE — 99291 CRITICAL CARE FIRST HOUR: CPT | Mod: 25

## 2024-02-18 PROCEDURE — 96361 HYDRATE IV INFUSION ADD-ON: CPT

## 2024-02-18 PROCEDURE — 80053 COMPREHEN METABOLIC PANEL: CPT | Performed by: EMERGENCY MEDICINE

## 2024-02-18 PROCEDURE — 93005 ELECTROCARDIOGRAM TRACING: CPT

## 2024-02-18 PROCEDURE — 85025 COMPLETE CBC W/AUTO DIFF WBC: CPT | Performed by: EMERGENCY MEDICINE

## 2024-02-18 PROCEDURE — 99292 CRITICAL CARE ADDL 30 MIN: CPT

## 2024-02-18 PROCEDURE — 250N000011 HC RX IP 250 OP 636: Performed by: EMERGENCY MEDICINE

## 2024-02-18 PROCEDURE — 36415 COLL VENOUS BLD VENIPUNCTURE: CPT | Performed by: EMERGENCY MEDICINE

## 2024-02-18 PROCEDURE — 84443 ASSAY THYROID STIM HORMONE: CPT | Performed by: EMERGENCY MEDICINE

## 2024-02-18 PROCEDURE — 84484 ASSAY OF TROPONIN QUANT: CPT | Performed by: EMERGENCY MEDICINE

## 2024-02-18 PROCEDURE — 80307 DRUG TEST PRSMV CHEM ANLYZR: CPT | Performed by: EMERGENCY MEDICINE

## 2024-02-18 PROCEDURE — 82077 ASSAY SPEC XCP UR&BREATH IA: CPT | Performed by: EMERGENCY MEDICINE

## 2024-02-18 PROCEDURE — 80349 CANNABINOIDS NATURAL: CPT | Performed by: EMERGENCY MEDICINE

## 2024-02-18 RX ORDER — LORAZEPAM 2 MG/ML
1 INJECTION INTRAMUSCULAR ONCE
Status: COMPLETED | OUTPATIENT
Start: 2024-02-18 | End: 2024-02-18

## 2024-02-18 RX ADMIN — LORAZEPAM 1 MG: 2 INJECTION INTRAMUSCULAR; INTRAVENOUS at 03:37

## 2024-02-18 RX ADMIN — SODIUM CHLORIDE 1000 ML: 9 INJECTION, SOLUTION INTRAVENOUS at 02:34

## 2024-02-18 ASSESSMENT — ACTIVITIES OF DAILY LIVING (ADL)
ADLS_ACUITY_SCORE: 35

## 2024-02-18 NOTE — ED NOTES
Bed: ED09  Expected date:   Expected time:   Means of arrival:   Comments:  Aure 542 23M etoh, concerned he smoked some laced THC

## 2024-02-18 NOTE — ED PROVIDER NOTES
"    History     Chief Complaint:  Anxiety and Palpitations       HPI   Darwin Todd is a 23 year old male who presents emergency department with palpitations, anxiety and chest tightness.  Patient reports that while at a bar he smoked some weed and then became quite anxious.  He is worried that something was laced within the week.  He also notes some alcohol use.  Noted some chest tightness and palpitations shortly thereafter.  These have since improved but are still present.  He denies any fever, chills, nausea or vomiting.    Medications:    QUEtiapine (SEROQUEL) 200 MG tablet      Past Medical History:    Past Medical History:   Diagnosis Date    Anxiety     Environmental allergies     Hyperlipidemia LDL goal <100     Marijuana use     Mild major depression (H24)     Prediabetes     Sickle cell trait (H24)        Past Surgical History:    Past Surgical History:   Procedure Laterality Date    ESOPHAGOSCOPY, GASTROSCOPY, DUODENOSCOPY (EGD), COMBINED N/A 8/24/2016    normal    NO HISTORY OF SURGERY            Physical Exam   Patient Vitals for the past 24 hrs:   BP Temp Temp src Pulse Resp SpO2 Height Weight   02/18/24 0500 138/84 -- -- 95 17 94 % -- --   02/18/24 0400 (!) 144/88 98  F (36.7  C) Oral 104 18 96 % -- --   02/18/24 0341 (!) 146/62 -- -- 112 12 95 % -- --   02/18/24 0211 (!) 171/102 97.8  F (36.6  C) Oral (!) 125 18 99 % 1.702 m (5' 7\") 89.8 kg (198 lb)        Physical Exam  General: Patient is awake, alert  Neck: Normal range of motion.   CV: tachycardiac but regular   Resp: Lungs are clear without wheezes or rales. No respiratory distress.   GI: Abdomen is soft, no rigidity, guarding, or rebound. No distension. No tenderness to palpation in any quadrant.    MS: Normal tone.    Skin: No rash or lesions noted. Normal capillary refill noted  Neuro: Speech is normal and fluent. Face is symmetric. Moving all extremities.   Psych:  Normal affect.  Appropriate interactions.       Emergency Department " Course   ECG  ECG results from 02/18/24   EKG 12-lead, tracing only     Value    Systolic Blood Pressure     Diastolic Blood Pressure     Ventricular Rate 126    Atrial Rate 126    MD Interval 170    QRS Duration 84        QTc 385    P Axis 43    R AXIS 73    T Axis 34    Interpretation ECG      Sinus tachycardia  Otherwise normal ECG  When compared with ECG of 19-DEC-2017 02:55,  Vent. rate has increased BY  52 BPM  Confirmed by GENERATED REPORT, COMPUTER ([id number]),  Wili Nolan ([id number]) on 2/18/2024 3:32:54 AM          Laboratory:  Labs Ordered and Resulted from Time of ED Arrival to Time of ED Departure   COMPREHENSIVE METABOLIC PANEL - Abnormal       Result Value    Sodium 140      Potassium 3.6      Carbon Dioxide (CO2) 24      Anion Gap 15      Urea Nitrogen 15.2      Creatinine 1.04      GFR Estimate >90      Calcium 9.4      Chloride 101      Glucose 127 (*)     Alkaline Phosphatase 130      AST 35      ALT 36      Protein Total 8.0      Albumin 4.7      Bilirubin Total <0.2     URINE DRUG SCREEN PANEL - Abnormal    Amphetamines Urine Screen Negative      Barbituates Urine Screen Negative      Benzodiazepine Urine Screen Negative      Cannabinoids Urine Screen Positive (*)     Cocaine Urine Screen Negative      Fentanyl Qual Urine Screen Negative      Opiates Urine Screen Negative      PCP Urine Screen Negative     ETHYL ALCOHOL LEVEL - Abnormal    Alcohol ethyl 0.12 (*)    CBC WITH PLATELETS AND DIFFERENTIAL - Abnormal    WBC Count 10.9      RBC Count 5.84      Hemoglobin 14.9      Hematocrit 46.7      MCV 80      MCH 25.5 (*)     MCHC 31.9      RDW 13.1      Platelet Count 327      % Neutrophils 55      % Lymphocytes 35      % Monocytes 6      % Eosinophils 3      % Basophils 1      % Immature Granulocytes 0      NRBCs per 100 WBC 0      Absolute Neutrophils 6.1      Absolute Lymphocytes 3.8      Absolute Monocytes 0.6      Absolute Eosinophils 0.3      Absolute Basophils 0.1      Absolute  Immature Granulocytes 0.0      Absolute NRBCs 0.0     TROPONIN T, HIGH SENSITIVITY - Normal    Troponin T, High Sensitivity <6     TSH WITH FREE T4 REFLEX - Normal    TSH 2.31     URINE CREATININE FOR DRUG SCREEN PANEL    Creatinine Urine for Drug Screen 32     THC CONFIRMATION QUANTITATIVE URINE            Emergency Department Course & Assessments:             Interventions:  Medications   sodium chloride 0.9% BOLUS 1,000 mL (0 mLs Intravenous Stopped 2/18/24 8917)   LORazepam (ATIVAN) injection 1 mg (1 mg Intravenous $Given 2/18/24 4780)           Disposition:  The patient was discharged to home.     Impression & Plan      Medical Decision Making:  Patient is a 23-year-old gentleman who presents emergency department with palpitations, anxiety and chest tightness after smoking marijuana.  Upon initial evaluation here he is mildly tachycardic but otherwise hemodynamically stable.  EKG shows a sinus tachycardia without any evidence of ischemia or dysrhythmia.  No signs of ACS, pericarditis or myocarditis.  Low suspicion for PE.  Patient was treated symptomatically as above and felt significantly improved.  Patient symptoms likely secondary because of marijuana use.  Recommended cessation of use and follow-up with his primary care doctor.    Diagnosis:    ICD-10-CM    1. Marijuana use  F12.90       2. Chest tightness  R07.89       3. Palpitations  R00.2            MD Isela Edmonds Christopher Joseph, MD  02/18/24 0741

## 2024-02-18 NOTE — ED NOTES
"During the assessment of pt this author asked the pt if he had any history of marijuana, pt replied \" Yes\" Pt mother interjected and stated\"No\" He has no drug history\". The pt turned to his mom and said Mom, you know I smoked wee in the past and I quit. I just got stupid tonight  and did this.\"     Pt mom became angry and stated \"  My son does not do drugs. This author inform his mom that  pt  is an adult and she should allow him to speak so that he can receive the care he needs. His mom was reminded that we are nurses and the Police. Pt mom asked \"What is the name of the girl that brought in the chair she was very rude\"  Pt  mom informed this author is unaware of who she is referring to.  Pt mom requested to speak to the charge nurse and stated \"I am going to report that staff\" The charge nurse Ally notified and is at the bedside meeting with pt and his mom.   "

## 2024-02-18 NOTE — ED TRIAGE NOTES
[place]. Per EMS pt reports while at the Bar, someone handed him wee. After smoking the wee he became anxious , /100, - 130 O2 Sat 100% RA BG 85. Hx Pt reports [place] marijuana [place] the pass.    Triage Assessment (Adult)       Row Name 02/18/24 0212          Triage Assessment    Airway WDL WDL        Respiratory WDL    Respiratory WDL WDL        Skin Circulation/Temperature WDL    Skin Circulation/Temperature WDL WDL        Cardiac WDL    Cardiac WDL WDL;X  125 Pt denied chest pain     Cardiac Rhythm ST        Peripheral/Neurovascular WDL    Peripheral Neurovascular WDL WDL;X        Cognitive/Neuro/Behavioral WDL    Cognitive/Neuro/Behavioral WDL WDL        Grey Coma Scale    Best Eye Response 4-->(E4) spontaneous     Best Motor Response 6-->(M6) obeys commands

## 2024-02-21 LAB
CANNABINOIDS UR CFM-MCNC: 14 NG/ML
CARBOXYTHC/CREAT UR: 44 NG/MG CREAT

## 2024-06-24 ENCOUNTER — OFFICE VISIT (OUTPATIENT)
Dept: URGENT CARE | Facility: URGENT CARE | Age: 24
End: 2024-06-24
Payer: COMMERCIAL

## 2024-06-24 ENCOUNTER — HOSPITAL ENCOUNTER (EMERGENCY)
Facility: CLINIC | Age: 24
Discharge: HOME | End: 2024-06-24
Attending: EMERGENCY MEDICINE | Admitting: EMERGENCY MEDICINE
Payer: COMMERCIAL

## 2024-06-24 VITALS
HEIGHT: 67 IN | HEART RATE: 60 BPM | DIASTOLIC BLOOD PRESSURE: 74 MMHG | RESPIRATION RATE: 16 BRPM | WEIGHT: 199 LBS | BODY MASS INDEX: 31.23 KG/M2 | OXYGEN SATURATION: 100 % | TEMPERATURE: 98.1 F | SYSTOLIC BLOOD PRESSURE: 142 MMHG

## 2024-06-24 VITALS
DIASTOLIC BLOOD PRESSURE: 80 MMHG | RESPIRATION RATE: 14 BRPM | BODY MASS INDEX: 31.17 KG/M2 | OXYGEN SATURATION: 98 % | SYSTOLIC BLOOD PRESSURE: 126 MMHG | TEMPERATURE: 98 F | HEART RATE: 60 BPM | WEIGHT: 199 LBS

## 2024-06-24 DIAGNOSIS — Z86.59 HISTORY OF BIPOLAR DISORDER: ICD-10-CM

## 2024-06-24 DIAGNOSIS — Z71.1 MENTAL HEALTH-RELATED COMPLAINT: Primary | ICD-10-CM

## 2024-06-24 PROCEDURE — 99207 PR NO CHARGE LOS: CPT | Performed by: FAMILY MEDICINE

## 2024-06-24 PROCEDURE — 99285 EMERGENCY DEPT VISIT HI MDM: CPT

## 2024-06-24 PROCEDURE — 99282 EMERGENCY DEPT VISIT SF MDM: CPT

## 2024-06-24 ASSESSMENT — COLUMBIA-SUICIDE SEVERITY RATING SCALE - C-SSRS
1. IN THE PAST MONTH, HAVE YOU WISHED YOU WERE DEAD OR WISHED YOU COULD GO TO SLEEP AND NOT WAKE UP?: NO
2. HAVE YOU ACTUALLY HAD ANY THOUGHTS OF KILLING YOURSELF IN THE PAST MONTH?: NO
6. HAVE YOU EVER DONE ANYTHING, STARTED TO DO ANYTHING, OR PREPARED TO DO ANYTHING TO END YOUR LIFE?: NO

## 2024-06-24 ASSESSMENT — ACTIVITIES OF DAILY LIVING (ADL): ADLS_ACUITY_SCORE: 35

## 2024-06-24 NOTE — ED NOTES
[facility]  ED to EMPATH Checklist:      Goal for EMPATH: Anxiety management and Medication management    Current Behavior: Calm and Cooperative    Safety Concerns: None    Legal Hold Status: Voluntary    Medically Cleared by ED provider: Yes    Patient Therapeutically Searched: Not searched - Currently in triage    Belongings: Remain with patient    Independent Ambulation at Baseline: Yes/No: Yes    Participates in Care/Conversation: Yes/No: Yes    Patient Informed about EMPATH: Yes/No: Yes    DEC: Not ordered    Patient Ready to be Transferred to EMPATH? Yes/No: Yes

## 2024-06-24 NOTE — ED PROVIDER NOTES
"  Emergency Department Note      History of Present Illness     Chief Complaint  Mental Health Problem    HPI  Darwin Todd is a 23 year old male with a history of depression and bipolar disorder presenting to the ED for psychiatric evaluation. The patient reports that he stopped taking his Seroquel last year because he did not like the way he felt while taking it. Now, he feels frequent agitation and has trouble calming himself down. The patient has a history of smoking Delta 8 and marijuana, but he does not do so currently. He has a history of altercations with the police such as interfering with a  and feeling from the police. He has been talking to a therapist, who referred him to a psychiatrist for prescriptions. The patient adds that he was diagnosed with bipolar disorder last year, and his father has Schizophrenia. 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 He denies suicidal/homicidal ideation or insomnia, or any recent  fever, cough, sore throat, rhinorrhea, or other medical concerns.      Independent Historian  None    Review of External Notes  None    Past Medical History   Medical History and Problem List  Anxiety  Allergies  Hyperlipidemia  Marijuana use  Mild major depression  Prediabetes  Sickle cell trait  Prediabetes  Iron deficiency anemia  Raynaud's disease without gangrene  Suicide attempt  Mood disorder  Benign postural proteinuria  Bipolar disorder    Medications  The patient is currently on no regular medications.     Surgical History   Esophagogastroduodenoscopy     Physical Exam   Patient Vitals for the past 24 hrs:   BP Temp Temp src Pulse Resp SpO2 Height Weight   06/24/24 1348 (!) 142/74 -- -- -- -- -- -- --   06/24/24 1345 -- 98.1  F (36.7  C) Temporal 60 16 100 % 1.702 m (5' 7\") 90.3 kg (199 lb)     Physical Exam  GENERAL: well developed, pleasant  HEAD: atraumatic  EYES: pupils reactive, extraocular muscles intact, conjunctivae " normal  ENT:  mucus membranes moist  NECK:  trachea midline, normal range of motion  RESPIRATORY: no tachypnea, breath sounds clear to auscultation   CVS: normal S1/S2, no murmurs, intact distal pulses  ABDOMEN: soft, nontender, nondistention  MUSCULOSKELETAL: no deformities  SKIN: warm and dry, no acute rashes or ulceration  NEURO: GCS 15, cranial nerves intact, alert and oriented x3  PSYCH:  Mood/affect normal      Diagnostics   Lab Results   Labs Ordered and Resulted from Time of ED Arrival to Time of ED Departure - No data to display    Imaging  No orders to display     Independent Interpretation  None    ED Course    Medications Administered  Medications - No data to display    Procedures  Procedures     Discussion of Management  None    Social Determinants of Health adding to complexity of care  Stress/Adjustment Disorders    ED Course  ED Course as of 06/24/24 1754   Mon Jun 24, 2024   1355 I obtained history and examined the patient as noted above.       Medical Decision Making / Diagnosis   CMS Diagnoses: None    MIPS     None    MDM  Darwin Todd is a 23 year old male presents with worsening depression and history of bipolar and not taking his meds.  He is feeling frequently agitated and would like to feel normal again.  Denies feeling suicidal or homicidal.  Patient denies any recent illnesses.  Denies any substance abuse.  [facility]  Does not require a hold.    Disposition  The patient was transferred to [facility].     ICD-10 Codes:    ICD-10-CM    1. History of bipolar disorder  Z86.59          Scribe Disclosure:  INasrin, am serving as a scribe at 1:57 PM on 6/24/2024 to document services personally performed by Yoni Pang MD based on my observations and the provider's statements to me.        Yoni Pang MD  06/24/24 1926

## 2024-06-24 NOTE — ED NOTES
Mother came up to desk allan that they could maybe keep patient for days. Mad that she was not aware of his whereabouts and that she was not involved in the triage (she was in a work call and not present). Mother and son stormed out and refusing treatment.

## 2024-06-24 NOTE — PROGRESS NOTES
Triage note:  Pt presents to  today with mom for mental health crisis intervention.  Pt feels like he is unable to calm his mind.  Previously hospitalized and discharged on Seroquel.  Did not like how Seroquel made him feel and is not currently taking it, but wants something different to help.  Mom reports mood changes and that household members are increasingly concerned about possible violence    Discussed with patient and his mom that evaluation today in the [facility] unit at [facility] would be the best option, as his current condition is more than is appropriate to manage in an urgent care setting.  Mom agrees to transport.  Left prior to me being able to print directions, but gave her the name and instructions to go to [facility] ER.

## 2024-06-24 NOTE — ED TRIAGE NOTES
Patient comes in with worsening anxiety.  last year was put on seroquel and didn't take it because they didn't like how it made them feel.  has had worsening restlessness, anxiety and agitation for the last year. Currently calm and cooperative. Denied drugs of ETOH.  would like med management. Denies SI/HI.     Triage Assessment (Adult)       Row Name 06/24/24 2347          Triage Assessment    Airway WDL WDL        Respiratory WDL    Respiratory WDL WDL        Skin Circulation/Temperature WDL    Skin Circulation/Temperature WDL WDL        Cardiac WDL    Cardiac WDL WDL        Peripheral/Neurovascular WDL    Peripheral Neurovascular WDL WDL        Cognitive/Neuro/Behavioral WDL    Cognitive/Neuro/Behavioral WDL WDL

## 2024-06-25 ENCOUNTER — PATIENT OUTREACH (OUTPATIENT)
Dept: FAMILY MEDICINE | Facility: CLINIC | Age: 24
End: 2024-06-25
Payer: COMMERCIAL

## 2024-06-25 NOTE — TELEPHONE ENCOUNTER
"  Transitions of Care Outreach  No chief complaint on file.      Most Recent Admission Date: 6/24/2024   Most Recent Admission Diagnosis:      Most Recent Discharge Date: 6/24/2024   Most Recent Discharge Diagnosis: History of bipolar disorder - Z86.59     Transitions of Care Assessment    Discharge Assessment  How are your symptoms? (Red Flag symptoms escalate to triage hotline per guidelines): Other (Not wanting to answer)  Do you know how to contact your clinic care team if you have future questions or changes to your health status? : Yes  Does the patient have their discharge instructions? : Unknown  Does the patient have questions regarding their discharge instructions? : No    Follow up Plan     Discharge Follow-Up  Discharge follow up appointment scheduled in alignment with recommended follow up timeframe or Transitions of Risk Category? (Low = within 30 days; Moderate= within 14 days; High= within 7 days): Yes  Discharge Follow Up Appointment Date: 07/26/24  Discharge Follow Up Appointment Scheduled with?: Primary Care Provider    Future Appointments   Date Time Provider Department Center   7/26/2024 10:00 AM Ghazal Jeffries MD [facility]       Outpatient Plan as outlined on AVS reviewed with patient.    For any urgent concerns, please contact our 24 hour nurse triage line: [phone] ([phone])       Lita Brooks RN    Of note per review patient (and mother) left ED without further care was cleared for [facility] but ED note indicates refused treatment. CTC on file for Mother Maria Isabel.     Mother: \"You guys created a disaster yesterday. He needed something for anxiety and depression. They didn't have to send him over there. \" In reference going from  to [facility] ED--[facility].     Kendall said he would set up annual visit but did not want visit to discuss mental health although does want review of mental health and mental health medications.  Mom also got on phone we discussed experience yesterday and family " has been under some stressors with one car, Kendall is starting new job etc. Mom says now was not the time for him to go inpatient and she feels it is good for Kendall to follow up with Dr. Jeffries, and is hoping she can help him with mood stabilizer or medications. Says is frustrating wait to get in for mental health services. Both Mom and Kendall seem satisfied with conversation but I was not able to get cooperation in getting all of screening questions done. Patient and mom both on phone and Kendall only willing to do follow up appointment for physical. Offered appointment today that opened up but declined due to transportation issues. Mom Maria Isabel was very thankful at end of our conversation and apologized for being abrupt initially on call.     Discussed some home management things to do in mean time to assist with mental health, good sleep, taking a walk or spending time in nature, deep breathing, spending time with the family pet or other things that are meaningful.     Lita Brooks R.N.

## 2024-07-26 ENCOUNTER — OFFICE VISIT (OUTPATIENT)
Dept: FAMILY MEDICINE | Facility: CLINIC | Age: 24
End: 2024-07-26
Payer: COMMERCIAL

## 2024-07-26 VITALS
WEIGHT: 196 LBS | HEART RATE: 69 BPM | TEMPERATURE: 98.3 F | DIASTOLIC BLOOD PRESSURE: 86 MMHG | BODY MASS INDEX: 30.76 KG/M2 | OXYGEN SATURATION: 98 % | RESPIRATION RATE: 14 BRPM | SYSTOLIC BLOOD PRESSURE: 128 MMHG | HEIGHT: 67 IN

## 2024-07-26 DIAGNOSIS — D57.3 SICKLE CELL TRAIT (H): ICD-10-CM

## 2024-07-26 DIAGNOSIS — Z00.00 ROUTINE GENERAL MEDICAL EXAMINATION AT A HEALTH CARE FACILITY: ICD-10-CM

## 2024-07-26 DIAGNOSIS — F31.61 BIPOLAR DISORDER, CURRENT EPISODE MIXED, MILD (H): ICD-10-CM

## 2024-07-26 DIAGNOSIS — E78.5 HYPERLIPIDEMIA LDL GOAL <100: ICD-10-CM

## 2024-07-26 LAB
ALBUMIN SERPL BCG-MCNC: 4.7 G/DL (ref 3.5–5.2)
ALP SERPL-CCNC: 112 U/L (ref 40–150)
ALT SERPL W P-5'-P-CCNC: 22 U/L (ref 0–70)
ANION GAP SERPL CALCULATED.3IONS-SCNC: 13 MMOL/L (ref 7–15)
AST SERPL W P-5'-P-CCNC: 39 U/L (ref 0–45)
BILIRUB SERPL-MCNC: 0.4 MG/DL
BUN SERPL-MCNC: 22.2 MG/DL (ref 6–20)
CALCIUM SERPL-MCNC: 9.9 MG/DL (ref 8.8–10.4)
CHLORIDE SERPL-SCNC: 105 MMOL/L (ref 98–107)
CHOLEST SERPL-MCNC: 216 MG/DL
CREAT SERPL-MCNC: 1.04 MG/DL (ref 0.67–1.17)
EGFRCR SERPLBLD CKD-EPI 2021: >90 ML/MIN/1.73M2
ERYTHROCYTE [DISTWIDTH] IN BLOOD BY AUTOMATED COUNT: 13.1 % (ref 10–15)
FASTING STATUS PATIENT QL REPORTED: YES
FASTING STATUS PATIENT QL REPORTED: YES
GLUCOSE SERPL-MCNC: 77 MG/DL (ref 70–99)
HCO3 SERPL-SCNC: 23 MMOL/L (ref 22–29)
HCT VFR BLD AUTO: 46.1 % (ref 40–53)
HDLC SERPL-MCNC: 53 MG/DL
HGB BLD-MCNC: 14.4 G/DL (ref 13.3–17.7)
LDLC SERPL CALC-MCNC: 152 MG/DL
MCH RBC QN AUTO: 25.3 PG (ref 26.5–33)
MCHC RBC AUTO-ENTMCNC: 31.2 G/DL (ref 31.5–36.5)
MCV RBC AUTO: 81 FL (ref 78–100)
NONHDLC SERPL-MCNC: 163 MG/DL
PLATELET # BLD AUTO: 270 10E3/UL (ref 150–450)
POTASSIUM SERPL-SCNC: 4 MMOL/L (ref 3.4–5.3)
PROT SERPL-MCNC: 7.8 G/DL (ref 6.4–8.3)
RBC # BLD AUTO: 5.69 10E6/UL (ref 4.4–5.9)
SODIUM SERPL-SCNC: 141 MMOL/L (ref 135–145)
TRIGL SERPL-MCNC: 53 MG/DL
WBC # BLD AUTO: 7.1 10E3/UL (ref 4–11)

## 2024-07-26 PROCEDURE — 96127 BRIEF EMOTIONAL/BEHAV ASSMT: CPT | Performed by: FAMILY MEDICINE

## 2024-07-26 PROCEDURE — 36415 COLL VENOUS BLD VENIPUNCTURE: CPT | Performed by: FAMILY MEDICINE

## 2024-07-26 PROCEDURE — 99395 PREV VISIT EST AGE 18-39: CPT | Performed by: FAMILY MEDICINE

## 2024-07-26 PROCEDURE — 85027 COMPLETE CBC AUTOMATED: CPT | Performed by: FAMILY MEDICINE

## 2024-07-26 PROCEDURE — 80053 COMPREHEN METABOLIC PANEL: CPT | Performed by: FAMILY MEDICINE

## 2024-07-26 PROCEDURE — 80061 LIPID PANEL: CPT | Performed by: FAMILY MEDICINE

## 2024-07-26 PROCEDURE — 99214 OFFICE O/P EST MOD 30 MIN: CPT | Mod: 25 | Performed by: FAMILY MEDICINE

## 2024-07-26 RX ORDER — LAMOTRIGINE 25 MG/1
25 TABLET ORAL DAILY
Qty: 60 TABLET | Refills: 0 | Status: SHIPPED | OUTPATIENT
Start: 2024-07-26 | End: 2024-10-03

## 2024-07-26 SDOH — HEALTH STABILITY: PHYSICAL HEALTH: ON AVERAGE, HOW MANY DAYS PER WEEK DO YOU ENGAGE IN MODERATE TO STRENUOUS EXERCISE (LIKE A BRISK WALK)?: 7 DAYS

## 2024-07-26 ASSESSMENT — PATIENT HEALTH QUESTIONNAIRE - PHQ9
SUM OF ALL RESPONSES TO PHQ QUESTIONS 1-9: 0
SUM OF ALL RESPONSES TO PHQ QUESTIONS 1-9: 0
10. IF YOU CHECKED OFF ANY PROBLEMS, HOW DIFFICULT HAVE THESE PROBLEMS MADE IT FOR YOU TO DO YOUR WORK, TAKE CARE OF THINGS AT HOME, OR GET ALONG WITH OTHER PEOPLE: NOT DIFFICULT AT ALL

## 2024-07-26 ASSESSMENT — SOCIAL DETERMINANTS OF HEALTH (SDOH): HOW OFTEN DO YOU GET TOGETHER WITH FRIENDS OR RELATIVES?: PATIENT DECLINED

## 2024-07-26 NOTE — PATIENT INSTRUCTIONS
Patient Education   Preventive Care Advice   This is general advice given by our system to help you stay healthy. However, your care team may have specific advice just for you. Please talk to your care team about your preventive care needs.  Nutrition  Eat 5 or more servings of fruits and vegetables each day.  Try wheat bread, brown rice and whole grain pasta (instead of white bread, rice, and pasta).  Get enough calcium and vitamin D. Check the label on foods and aim for 100% of the RDA (recommended daily allowance).  Lifestyle  Exercise at least 150 minutes each week  (30 minutes a day, 5 days a week).  Do muscle strengthening activities 2 days a week. These help control your weight and prevent disease.  No smoking.  Wear sunscreen to prevent skin cancer.  Have a dental exam and cleaning every 6 months.  Yearly exams  See your health care team every year to talk about:  Any changes in your health.  Any medicines your care team has prescribed.  Preventive care, family planning, and ways to prevent chronic diseases.  Shots (vaccines)   HPV shots (up to age 26), if you've never had them before.  Hepatitis B shots (up to age 59), if you've never had them before.  COVID-19 shot: Get this shot when it's due.  Flu shot: Get a flu shot every year.  Tetanus shot: Get a tetanus shot every 10 years.  Pneumococcal, hepatitis A, and RSV shots: Ask your care team if you need these based on your risk.  Shingles shot (for age 50 and up)  General health tests  Diabetes screening:  Starting at age 35, Get screened for diabetes at least every 3 years.  If you are younger than age 35, ask your care team if you should be screened for diabetes.  Cholesterol test: At age 39, start having a cholesterol test every 5 years, or more often if advised.  Bone density scan (DEXA): At age 50, ask your care team if you should have this scan for osteoporosis (brittle bones).  Hepatitis C: Get tested at least once in your life.  STIs (sexually  transmitted infections)  Before age 24: Ask your care team if you should be screened for STIs.  After age 24: Get screened for STIs if you're at risk. You are at risk for STIs (including HIV) if:  You are sexually active with more than one person.  You don't use condoms every time.  You or a partner was diagnosed with a sexually transmitted infection.  If you are at risk for HIV, ask about PrEP medicine to prevent HIV.  Get tested for HIV at least once in your life, whether you are at risk for HIV or not.  Cancer screening tests  Cervical cancer screening: If you have a cervix, begin getting regular cervical cancer screening tests starting at age 21.  Breast cancer scan (mammogram): If you've ever had breasts, begin having regular mammograms starting at age 40. This is a scan to check for breast cancer.  Colon cancer screening: It is important to start screening for colon cancer at age 45.  Have a colonoscopy test every 10 years (or more often if you're at risk) Or, ask your provider about stool tests like a FIT test every year or Cologuard test every 3 years.  To learn more about your testing options, visit:   .  For help making a decision, visit:   https://bit.ly/lc28334.  Prostate cancer screening test: If you have a prostate, ask your care team if a prostate cancer screening test (PSA) at age 55 is right for you.  Lung cancer screening: If you are a current or former smoker ages 50 to 80, ask your care team if ongoing lung cancer screenings are right for you.  For informational purposes only. Not to replace the advice of your health care provider. Copyright   2023 [employer]. All rights reserved. Clinically reviewed by the [facility] Transitions Program. [employer] [id number] - REV 01/24.

## 2024-07-26 NOTE — PROGRESS NOTES
"Preventive Care Visit  [facility]  Ghazal Jeffries MD, Family Medicine  Jul 26, 2024      Assessment & Plan   In clinic for annual exam   [facility] .  History of bipolar disorder was in ER recently    Was on Seroquel , did not like how medication make him feel .  Using Marihuana 3 gm per week .    (Z00.00) Routine general medical examination at a health care facility  Comment: We discussed in detail healthy eating .  Maintaining ideal weight  , regular exercise   Plan: we discussed cutting down to Marihuana   Following with the therapist .     (E78.5) Hyperlipidemia LDL goal <100  Comment:   Plan: Comprehensive metabolic panel (BMP + Alb, Alk         Phos, ALT, AST, Total. Bili, TP), Lipid panel         reflex to direct LDL Fasting, CANCELED: Lipid         panel reflex to direct LDL Fasting,       (D57.3) Sickle cell trait (H24)  Comment: will continue to monitor lab  Plan: CBC with platelets, CANCELED: CBC with         platelets            (F31.61) Bipolar disorder, current episode mixed, mild (H)  Comment: Will start lamictal , will slowly increase the dose   Discussed side effect   Psychiatrist visit scheduled .  Plan: lamoTRIgine (LAMICTAL) 25 MG tablet, Adult         Mental Health  Referral, CANCELED:         Adult Mental Health  Referral                  BMI  Estimated body mass index is 30.93 kg/m  as calculated from the following:    Height as of this encounter: 1.695 m (5' 6.75\").    Weight as of this encounter: 88.9 kg (196 lb).   Weight management plan: Discussed healthy diet and exercise guidelines    Counseling  Appropriate preventive services were addressed with this patient via screening, questionnaire, or discussion as appropriate for fall prevention, nutrition, physical activity, Tobacco-use cessation, weight loss and cognition.  Checklist reviewing preventive services available has been given to the patient.  Reviewed patient's diet, addressing " concerns and/or questions.   The patient was instructed to see the dentist every 6 months.         Anai Argueta is a 24 year old, presenting for the following:  Physical  Working a warehouse .  Doing well .      7/26/2024     9:45 AM   Additional Questions   Roomed by Cuba Royal   Accompanied by self        Health Care Directive  Patient does not have a Health Care Directive or Living Will: Discussed advance care planning with patient; information given to patient to review.    HPI        7/26/2024   General Health   How would you rate your overall physical health? (!) FAIR   Feel stress (tense, anxious, or unable to sleep) To some extent      (!) STRESS CONCERN      7/26/2024   Nutrition   Three or more servings of calcium each day? (!) NO   Diet: Regular (no restrictions)   How many servings of fruit and vegetables per day? (!) 0-1   How many sweetened beverages each day? 0-1            7/26/2024   Exercise   Days per week of moderate/strenous exercise 7 days            7/26/2024   Social Factors   Frequency of gathering with friends or relatives Patient declined   Worry food won't last until get money to buy more No   Food not last or not have enough money for food? No   Do you have housing? (Housing is defined as stable permanent housing and does not include staying ouside in a car, in a tent, in an abandoned building, in an overnight shelter, or couch-surfing.) Yes   Are you worried about losing your housing? No   Lack of transportation? No   Unable to get utilities (heat,electricity)? No            7/26/2024   Dental   Dentist two times every year? (!) NO            7/26/2024   TB Screening   Were you born outside of the [place]? No          Today's PHQ-9 Score:       7/26/2024     9:39 AM   PHQ-9 SCORE   PHQ-9 Total Score MyChart 0   PHQ-9 Total Score 0         7/26/2024   Substance Use   Alcohol more than 3/day or more than 7/wk No   Do you use any other substances recreationally? No        Social History      Tobacco Use    Smoking status: Former     Current packs/day: 0.25     Types: Cigarettes    Smokeless tobacco: Never    Tobacco comments:      quit 1/2018   Vaping Use    Vaping status: Every Day   Substance Use Topics    Alcohol use: Yes     Comment: 1-5 per year    Drug use: No             7/26/2024   One time HIV Screening   Previous HIV test? I don't know          7/26/2024   STI Screening   New sexual partner(s) since last STI/HIV test? (!) DECLINE            7/26/2024   Contraception/Family Planning   Questions about contraception or family planning No           Reviewed and updated as needed this visit by Provider                    Past Medical History:   Diagnosis Date    Anxiety     Environmental allergies     Hyperlipidemia LDL goal <100     Marijuana use     Mild major depression (H24)     Prediabetes     Sickle cell trait (H24)      Past Surgical History:   Procedure Laterality Date    ESOPHAGOSCOPY, GASTROSCOPY, DUODENOSCOPY (EGD), COMBINED N/A 8/24/2016    normal    NO HISTORY OF SURGERY           Review of Systems  CONSTITUTIONAL: NEGATIVE for fever, chills, change in weight  INTEGUMENTARY/SKIN: NEGATIVE for worrisome rashes, moles or lesions  EYES: NEGATIVE for vision changes or irritation  ENT/MOUTH: NEGATIVE for ear, mouth and throat problems  RESP: NEGATIVE for significant cough or SOB  BREAST: NEGATIVE for masses, tenderness or discharge  CV: NEGATIVE for chest pain, palpitations or peripheral edema  GI: NEGATIVE for nausea, abdominal pain, heartburn, or change in bowel habits  : NEGATIVE for frequency, dysuria, or hematuria  MUSCULOSKELETAL: NEGATIVE for significant arthralgias or myalgia  NEURO: NEGATIVE for weakness, dizziness or paresthesias  ENDOCRINE: NEGATIVE for temperature intolerance, skin/hair changes  HEME: NEGATIVE for bleeding problems  PSYCHIATRIC: NEGATIVE for changes in mood or affect     Objective    Exam  There were no vitals taken for this visit.   Estimated body mass  "index is 31.17 kg/m  as calculated from the following:    Height as of 6/24/24: 1.702 m (5' 7\").    Weight as of 6/24/24: 90.3 kg (199 lb).    Physical Exam  GENERAL: alert and no distress  EYES: Eyes grossly normal to inspection, PERRL and conjunctivae and sclerae normal  HENT: ear canals and TM's normal, nose and mouth without ulcers or lesions  NECK: no adenopathy, no asymmetry, masses, or scars  RESP: lungs clear to auscultation - no rales, rhonchi or wheezes  CV: regular rate and rhythm, normal S1 S2, no S3 or S4, no murmur, click or rub, no peripheral edema  ABDOMEN: soft, nontender, no hepatosplenomegaly, no masses and bowel sounds normal  MS: no gross musculoskeletal defects noted, no edema  SKIN: no suspicious lesions or rashes  NEURO: Normal strength and tone, mentation intact and speech normal  PSYCH:anxious         Signed Electronically by: Ghazal Jeffries MD    Answers submitted by the patient for this visit:  Patient Health Questionnaire (Submitted on 7/26/2024)  If you checked off any problems, how difficult have these problems made it for you to do your work, take care of things at home, or get along with other people?: Not difficult at all  PHQ9 TOTAL SCORE: 0    "

## 2024-10-02 DIAGNOSIS — F31.61 BIPOLAR DISORDER, CURRENT EPISODE MIXED, MILD (H): ICD-10-CM

## 2024-10-02 RX ORDER — LAMOTRIGINE 25 MG/1
25 TABLET ORAL DAILY
Qty: 60 TABLET | Refills: 0 | OUTPATIENT
Start: 2024-10-02

## 2024-10-03 RX ORDER — LAMOTRIGINE 25 MG/1
25 TABLET ORAL DAILY
Qty: 90 TABLET | Refills: 1 | Status: SHIPPED | OUTPATIENT
Start: 2024-10-03

## 2024-11-29 ENCOUNTER — HOSPITAL ENCOUNTER (EMERGENCY)
Facility: CLINIC | Age: 24
Discharge: HOME | End: 2024-11-29
Attending: EMERGENCY MEDICINE | Admitting: EMERGENCY MEDICINE
Payer: COMMERCIAL

## 2024-11-29 VITALS
DIASTOLIC BLOOD PRESSURE: 62 MMHG | RESPIRATION RATE: 16 BRPM | WEIGHT: 187 LBS | TEMPERATURE: 98.6 F | HEIGHT: 67 IN | BODY MASS INDEX: 29.35 KG/M2 | OXYGEN SATURATION: 97 % | SYSTOLIC BLOOD PRESSURE: 126 MMHG | HEART RATE: 63 BPM

## 2024-11-29 DIAGNOSIS — F30.10 MANIC BEHAVIOR (H): ICD-10-CM

## 2024-11-29 DIAGNOSIS — F10.929 ALCOHOLIC INTOXICATION WITH COMPLICATION (H): ICD-10-CM

## 2024-11-29 PROBLEM — F39 UNSPECIFIED MOOD (AFFECTIVE) DISORDER (H): Status: ACTIVE | Noted: 2024-11-29

## 2024-11-29 PROBLEM — F41.1 GENERALIZED ANXIETY DISORDER: Status: ACTIVE | Noted: 2024-11-29

## 2024-11-29 LAB
ALBUMIN SERPL BCG-MCNC: 4.7 G/DL (ref 3.5–5.2)
ALP SERPL-CCNC: 101 U/L (ref 40–150)
ALT SERPL W P-5'-P-CCNC: 27 U/L (ref 0–70)
ANION GAP SERPL CALCULATED.3IONS-SCNC: 18 MMOL/L (ref 7–15)
APAP SERPL-MCNC: <5 UG/ML (ref 10–30)
AST SERPL W P-5'-P-CCNC: 33 U/L (ref 0–45)
BASOPHILS # BLD AUTO: 0.1 10E3/UL (ref 0–0.2)
BASOPHILS NFR BLD AUTO: 1 %
BILIRUB SERPL-MCNC: 0.2 MG/DL
BUN SERPL-MCNC: 10.3 MG/DL (ref 6–20)
CALCIUM SERPL-MCNC: 9.1 MG/DL (ref 8.8–10.4)
CHLORIDE SERPL-SCNC: 107 MMOL/L (ref 98–107)
CREAT SERPL-MCNC: 0.87 MG/DL (ref 0.67–1.17)
EGFRCR SERPLBLD CKD-EPI 2021: >90 ML/MIN/1.73M2
EOSINOPHIL # BLD AUTO: 0.1 10E3/UL (ref 0–0.7)
EOSINOPHIL NFR BLD AUTO: 1 %
ERYTHROCYTE [DISTWIDTH] IN BLOOD BY AUTOMATED COUNT: 13.2 % (ref 10–15)
ETHANOL SERPL-MCNC: 0.13 G/DL
GLUCOSE SERPL-MCNC: 85 MG/DL (ref 70–99)
HCO3 SERPL-SCNC: 20 MMOL/L (ref 22–29)
HCT VFR BLD AUTO: 46 % (ref 40–53)
HGB BLD-MCNC: 14.8 G/DL (ref 13.3–17.7)
HOLD SPECIMEN: NORMAL
IMM GRANULOCYTES # BLD: 0 10E3/UL
IMM GRANULOCYTES NFR BLD: 0 %
LYMPHOCYTES # BLD AUTO: 2.4 10E3/UL (ref 0.8–5.3)
LYMPHOCYTES NFR BLD AUTO: 29 %
MCH RBC QN AUTO: 25.4 PG (ref 26.5–33)
MCHC RBC AUTO-ENTMCNC: 32.2 G/DL (ref 31.5–36.5)
MCV RBC AUTO: 79 FL (ref 78–100)
MONOCYTES # BLD AUTO: 0.5 10E3/UL (ref 0–1.3)
MONOCYTES NFR BLD AUTO: 6 %
NEUTROPHILS # BLD AUTO: 5.1 10E3/UL (ref 1.6–8.3)
NEUTROPHILS NFR BLD AUTO: 63 %
NRBC # BLD AUTO: 0 10E3/UL
NRBC BLD AUTO-RTO: 0 /100
PLATELET # BLD AUTO: 234 10E3/UL (ref 150–450)
POTASSIUM SERPL-SCNC: 3.6 MMOL/L (ref 3.4–5.3)
PROT SERPL-MCNC: 7.4 G/DL (ref 6.4–8.3)
RBC # BLD AUTO: 5.82 10E6/UL (ref 4.4–5.9)
SALICYLATES SERPL-MCNC: <0.3 MG/DL
SODIUM SERPL-SCNC: 145 MMOL/L (ref 135–145)
WBC # BLD AUTO: 8.1 10E3/UL (ref 4–11)

## 2024-11-29 PROCEDURE — 250N000011 HC RX IP 250 OP 636: Mod: JZ | Performed by: EMERGENCY MEDICINE

## 2024-11-29 PROCEDURE — 82077 ASSAY SPEC XCP UR&BREATH IA: CPT | Performed by: EMERGENCY MEDICINE

## 2024-11-29 PROCEDURE — 36415 COLL VENOUS BLD VENIPUNCTURE: CPT | Performed by: EMERGENCY MEDICINE

## 2024-11-29 PROCEDURE — 99285 EMERGENCY DEPT VISIT HI MDM: CPT

## 2024-11-29 PROCEDURE — 82040 ASSAY OF SERUM ALBUMIN: CPT | Performed by: EMERGENCY MEDICINE

## 2024-11-29 PROCEDURE — 80179 DRUG ASSAY SALICYLATE: CPT | Performed by: EMERGENCY MEDICINE

## 2024-11-29 PROCEDURE — 80143 DRUG ASSAY ACETAMINOPHEN: CPT | Performed by: EMERGENCY MEDICINE

## 2024-11-29 PROCEDURE — 82947 ASSAY GLUCOSE BLOOD QUANT: CPT | Performed by: EMERGENCY MEDICINE

## 2024-11-29 PROCEDURE — 96372 THER/PROPH/DIAG INJ SC/IM: CPT | Performed by: EMERGENCY MEDICINE

## 2024-11-29 PROCEDURE — 80051 ELECTROLYTE PANEL: CPT | Performed by: EMERGENCY MEDICINE

## 2024-11-29 PROCEDURE — 85025 COMPLETE CBC W/AUTO DIFF WBC: CPT | Performed by: EMERGENCY MEDICINE

## 2024-11-29 RX ORDER — OLANZAPINE 10 MG/2ML
10 INJECTION, POWDER, FOR SOLUTION INTRAMUSCULAR ONCE
Status: COMPLETED | OUTPATIENT
Start: 2024-11-29 | End: 2024-11-29

## 2024-11-29 RX ADMIN — OLANZAPINE 10 MG: 10 INJECTION, POWDER, FOR SOLUTION INTRAMUSCULAR at 03:32

## 2024-11-29 ASSESSMENT — ACTIVITIES OF DAILY LIVING (ADL)
ADLS_ACUITY_SCORE: 41

## 2024-11-29 ASSESSMENT — COLUMBIA-SUICIDE SEVERITY RATING SCALE - C-SSRS: IS THE PATIENT NOT ABLE TO COMPLETE C-SSRS: UNABLE TO VERBALIZE

## 2024-11-29 NOTE — ED TRIAGE NOTES
"Pt arrives via EMS coming from [facility] after having 7 or 8 beers per patient report. EMS called after patient making SI and HI threats. BG 76.  When patient arrived to ED in restraints. Patient continues erratic behavior and stated \"Dictators and evil people in the world should be killed and I want to be able to express myself the way I want to\". Restraints continued. Patient placed on MADELINE     Triage Assessment (Adult)       Row Name 11/29/24 0314          Triage Assessment    Airway WDL WDL        Respiratory WDL    Respiratory WDL WDL        Skin Circulation/Temperature WDL    Skin Circulation/Temperature WDL WDL        Cardiac WDL    Cardiac WDL WDL        Peripheral/Neurovascular WDL    Peripheral Neurovascular WDL WDL        Cognitive/Neuro/Behavioral WDL    Cognitive/Neuro/Behavioral WDL X;mood/behavior     Level of Consciousness alert     Arousal Level opens eyes spontaneously     Orientation person     Mood/Behavior agitated;angry;hyperactive (agitated, impulsive);threatening        Pupils (CN II)    Pupil PERRLA yes     Pupil Size Left 4 mm     Pupil Size Right 4 mm        Grey Coma Scale    Best Eye Response 4-->(E4) spontaneous     Best Motor Response 6-->(M6) obeys commands     Best Verbal Response 5-->(V5) oriented     [place] Coma Scale Score 15     Assessment Qualifiers other (see comments)  pt intoxicated                     "

## 2024-11-29 NOTE — ED PROVIDER NOTES
"Sign-out Note    Received this patient in sign-out from Dr. Trujillo at 7:16 AM.  Please refer to earlier documentation detailing presenting complaints, evaluation, and ED course.  In brief, patient is being seen in the ER for abnormal behavior at the [facility].  EtOH prior to arrival.  Arrived in restraints and received 10 mg IM zyprexa.  Now out of restraints.  Has been resistant to medications in the past    Recommendations from previous provider: Sober reassessment, DEC assessment and disposition.      ED course under my care:    2:19 PM: Spoke with Mo from DEC.   She has met with patient and also contacted patient's mother.  Patient at this time has no thoughts of self-harm, suicide, or harm to others.  He has previously been involved with therapy, though developed a relationship with his previous therapist, thus resulting in discontinuation of therapy.  He is open to further outpatient resources, which mother also is in agreement with.  At this time, he is felt stable for discharge.  Appointments have been made for therapy and psychiatry as an outpatient.    I reevaluated the patient.  He is clinically sober, awake, alert, cooperative, and very pleasant.  He denies any current or previous thoughts of self-harm, nor harm to others.  He denies any weapons in the home.  He states this statement he made to police that \"evil people should not be around\" was a broad generalized statement, discussing the current state of the world, rather than a specific threat towards a specific person or group of individuals.  He is future oriented, and agreeable with proposed plan of care.  With reasonable clinical certainty, I feel he is appropriate for outpatient management.    Disposition: Discharge with outpatient resources.         Amandeep Neri MD  11/29/24 3465    "

## 2024-11-29 NOTE — DISCHARGE INSTRUCTIONS
Please follow-up with the resources discussed.  Return to the ER if you develop any new or worsening symptoms, concerns for your own safety, or the safety of others.      Date: Wednesday, 12/4/2024  Time: 4:00 pm - 5:00 pm  Provider: Lorene Echeverria  MSN  CNP,PMHNP,RN  Location: [facility], [address] [place] [address]  Phone: [phone]  Type: Telepsychiatry    Date: Wednesday, 12/4/2024  Time: 1:00 pm - 2:00 pm  Provider: Carmen Johnson MA  LMFT  Location: [facility], [address] [place] [address], [facility] [address], [place] [address]  Phone: [phone]  Type: Therapy - Initial (In-Person)    Discharge Instructions  Mental Health Concerns    You were seen today for mental health concerns, such as depression, anxiety, or suicidal thinking. Your provider feels that you do not require hospitalization at this time. However, your symptoms may become worse, and you may need to return to the Emergency Department. Most treatments of depression and suicidal thoughts are a process rather than a single intervention.  Medications and counseling can take several weeks or more to help.    Generally, every Emergency Department visit should have a follow-up clinic visit with either a primary or a specialty clinic/provider. Please follow-up as instructed by your emergency provider today.    By accepting these discharge instructions:  You promise to not harm yourself or others.  You agree that if you feel you are becoming unable to keep that promise, you will do something to help yourself before you do anything to harm yourself or others.   You agree to keep any safety plan arranged on your visit here today.  You agree to take any medication prescribed or recommended by your provider.  If you are getting worse, you can contact a friend or a family member, contact your counselor or family provider, contact a crisis line, or other options discussed with the provider or therapist today.  At any time,  you can call 911 and return to the Emergency Department for more help.  You understand that follow-up is essential to your treatment, and you will make and keep appointments recommended on your visit today.    How to improve your mental health and prevent suicide:  Involve others by letting family, friends, counselors know.  Do not isolate yourself.  Avoid alcohol or drugs. Remove weapons, poisons from your home.  Try to stick to routines for eating, sleeping and getting regular exercise.    Try to get into sunlight. Bright natural light not only treats seasonal affective disorder but also depression.  Increase safe activities that you enjoy.    If you feel worse, contact [phone] ([phone]), or call 911, or your primary provider/counselor for additional assistance.    If you were given a prescription for medicine here today, be sure to read all of the information (including the package insert) that comes with your prescription.  This will include important information about the medicine, its side effects, and any warnings that you need to know about.  The pharmacist who fills the prescription can provide more information and answer questions you may have about the medicine.  If you have questions or concerns that the pharmacist cannot address, please call or return to the Emergency Department.   Remember that you can always come back to the Emergency Department if you are not able to see your regular provider in the amount of time listed above, if you get any new symptoms, or if there is anything that worries you.

## 2024-11-29 NOTE — ED NOTES
"Pt allowed vital signs to be taken. Stated \"I need to leave by 430 pm to be at work today to get my [employer] bonus. I will be leaving.\"   "
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
## 2024-11-29 NOTE — ED PROVIDER NOTES
"  Emergency Department Note      History of Present Illness     Chief Complaint  Manic Behavior    History limited due to patient's intoxication and behavior    HPI  Dar[place] Todd is a 24 year old male with history of anxiety, depression, and bipolar disorder who presents to the ED via EMS for evaluation of manic behavior. EMS reports patient was at [facility] making suicidal and homicidal threats. He would express statements like \"Dictators all need to die\", \"Dictators and evil people in the world should be killed and I want to be able to express myself the way I want to\" and  \"I am in control of my mind and body\" many times.  Patient also making many statements saying that \"the white people just want you to think a certain way \".  Patient states he has not had a good night sleep in some time. Restraints was placed on by EMS. Patient smokes marijuana but not today. He drank 7-8 drinks of alcohol tonight. No use of medications though he used to be on medications for bipolar disorder.  He states he stopped these medications because they \"destroyed the cells in his body and he likes to workout.\"  He notes he smokes marijuana but not in the last 24 hours.    Independent Historian  EMS as detailed above.    Review of External Notes  Care everywhere reviewed and epic updated    Past Medical History   Medical History and Problem List   Past Medical History:   Diagnosis Date    Anxiety     Bipolar disorder     Depression     Environmental allergies     Hyperlipidemia     Marijuana use     Prediabetes     Sickle cell trait      Medications   No current outpatient medications on file.    Surgical History   Past Surgical History:   Procedure Laterality Date    ESOPHAGOSCOPY, GASTROSCOPY, DUODENOSCOPY (EGD), COMBINED N/A 08/24/2016    normal     Physical Exam   Patient Vitals for the past 24 hrs:   BP Temp Pulse Resp SpO2 Height Weight   11/29/24 0554 -- -- -- 16 -- -- --   11/29/24 0545 -- -- -- 16 -- -- --   11/29/24 0530 " "-- -- -- 16 -- -- --   11/29/24 0515 -- -- -- 15 -- -- --   11/29/24 0500 -- -- -- 16 -- -- --   11/29/24 0445 -- -- -- 17 -- -- --   11/29/24 0440 116/74 -- 85 -- 94 % -- --   11/29/24 0430 -- -- -- 15 -- -- --   11/29/24 0415 -- -- -- 15 -- -- --   11/29/24 0400 -- -- -- 16 -- -- --   11/29/24 0345 -- -- -- 15 -- -- --   11/29/24 0330 -- -- -- 18 -- -- --   11/29/24 0328  146/92 98.6  F (37  C) 78 18 96 % 1.702 m (5' 7\") 84.8 kg (187 lb)   11/29/24 0318 -- -- -- 16 -- -- --     Physical Exam  Nursing note and vitals reviewed.  Constitutional: Patient in 4 point restraints.  Patient is interrupting staff members and using profanities fairly frequently.  Quite argumentative.  HENT:   Mouth/Throat: Mucous membranes are dry.   Cardiovascular: Normal rate, regular rhythm and normal heart sounds.  No murmur.  Pulmonary/Chest: Effort normal and breath sounds normal. No respiratory distress. No wheezes. No rales.   Abdominal: Soft. Normal appearance. There is no tenderness. There is no rigidity and no guarding.   Musculoskeletal: Normal range of motion.   Neurological: Alert.  Oriented x 3.  Strength normal  Skin: Skin is warm and dry.   Psychiatric: Anxious appearing. Pressured speech.  Somewhat tangential thought process.  Confrontational.     Diagnostics   Lab Results   Labs Ordered and Resulted from Time of ED Arrival to Time of ED Departure   COMPREHENSIVE METABOLIC PANEL - Abnormal       Result Value    Sodium 145      Potassium 3.6      Carbon Dioxide (CO2) 20 (*)     Anion Gap 18 (*)     Urea Nitrogen 10.3      Creatinine 0.87      GFR Estimate >90      Calcium 9.1      Chloride 107      Glucose 85      Alkaline Phosphatase 101      AST 33      ALT 27      Protein Total 7.4      Albumin 4.7      Bilirubin Total 0.2     ETHYL ALCOHOL LEVEL - Abnormal    Alcohol ethyl 0.13 (*)    ACETAMINOPHEN LEVEL - Abnormal    Acetaminophen <5.0 (*)    CBC WITH PLATELETS AND DIFFERENTIAL - Abnormal    WBC Count 8.1      RBC Count " 5.82      Hemoglobin 14.8      Hematocrit 46.0      MCV 79      MCH 25.4 (*)     MCHC 32.2      RDW 13.2      Platelet Count 234      % Neutrophils 63      % Lymphocytes 29      % Monocytes 6      % Eosinophils 1      % Basophils 1      % Immature Granulocytes 0      NRBCs per 100 WBC 0      Absolute Neutrophils 5.1      Absolute Lymphocytes 2.4      Absolute Monocytes 0.5      Absolute Eosinophils 0.1      Absolute Basophils 0.1      Absolute Immature Granulocytes 0.0      Absolute NRBCs 0.0     SALICYLATE LEVEL - Normal    Salicylate <0.3       Urine drug screen: Pending collection    ED Course      Medications Administered  Medications   OLANZapine (zyPREXA) injection 10 mg (has no administration in time range)     Discussion of Management  DEC: Consultation pending    Additional Documentation  None    ED Course  ED Course as of 11/29/24 0617   Fri Nov 29, 2024   0306 I obtained history and examined the patient as noted above. Restraints is continued to be placed on in ED.   0318 Patient remained in restraints from EMS due to safety concerns and lack of capacity to contract for safety.  MADELINE placed.   0515 I rechecked patient. Patient is sleeping comfortably. We are planning to take off restraints when patient is moved into a different room.    0617 Patient rechecked.  He is currently sleeping comfortably.     Medical Decision Making / Diagnosis     MDM  This is a 24-year-old gentleman with known bipolar disorder reportedly not taking his medications who [place]  He is mildly intoxicated with a blood alcohol of 0.13.  On exam he was restrained but was clearly presenting with pressured speech.  Tangential thought process, poor insight.  He endorses lack of sleep.  He also reports using marijuana though not recently.  He was pharmacologically treated with IM Zyprexa with good response.  We have now been able to remove the restraints.  He has never been violent.  Plan will be  continued observation on a medical hold with DEC consultation once he is able to participate.  Disposition pending this evaluation and recheck by the oncoming ED physician.  Toxicologic metabolic screening has been unremarkable.  Urine drug screen is pending collection at this time    Disposition  Patient signed out to oncoming ED physician at 0700 shift change.  Patient remains on a hold and is cooperative, resting in bed awaiting DEC evaluation    ICD-10 Codes:    ICD-10-CM    1. Alcoholic intoxication with complication (H)  F10.929       2. Manic behavior (H)  F30.10          Scribe Disclosure:  Payal MORTON, am serving as a scribe at 3:11 AM on 11/29/2024 to document services personally performed by Antonio Trujillo MD based on my observations and the provider's statements to me.      Antonio Trujillo MD  11/29/24 0618

## 2024-12-23 ENCOUNTER — HOSPITAL ENCOUNTER (EMERGENCY)
Facility: CLINIC | Age: 24
Discharge: HOME | End: 2024-12-23
Attending: EMERGENCY MEDICINE
Payer: COMMERCIAL

## 2024-12-23 VITALS
HEART RATE: 80 BPM | RESPIRATION RATE: 18 BRPM | SYSTOLIC BLOOD PRESSURE: 138 MMHG | TEMPERATURE: 98.7 F | DIASTOLIC BLOOD PRESSURE: 81 MMHG | OXYGEN SATURATION: 98 %

## 2024-12-23 DIAGNOSIS — F10.920 ALCOHOLIC INTOXICATION WITHOUT COMPLICATION (H): ICD-10-CM

## 2024-12-23 DIAGNOSIS — Z86.59 HISTORY OF BIPOLAR DISORDER: ICD-10-CM

## 2024-12-23 LAB
ALBUMIN SERPL BCG-MCNC: 4.9 G/DL (ref 3.5–5.2)
ALP SERPL-CCNC: 106 U/L (ref 40–150)
ALT SERPL W P-5'-P-CCNC: 25 U/L (ref 0–70)
AMPHETAMINES UR QL SCN: ABNORMAL
ANION GAP SERPL CALCULATED.3IONS-SCNC: 19 MMOL/L (ref 7–15)
AST SERPL W P-5'-P-CCNC: 32 U/L (ref 0–45)
B-OH-BUTYR SERPL-SCNC: 0.29 MMOL/L
BARBITURATES UR QL SCN: ABNORMAL
BASOPHILS # BLD AUTO: 0.1 10E3/UL (ref 0–0.2)
BASOPHILS NFR BLD AUTO: 1 %
BENZODIAZ UR QL SCN: ABNORMAL
BILIRUB SERPL-MCNC: 0.4 MG/DL
BUN SERPL-MCNC: 10.6 MG/DL (ref 6–20)
BZE UR QL SCN: ABNORMAL
CALCIUM SERPL-MCNC: 9.6 MG/DL (ref 8.8–10.4)
CANNABINOIDS UR QL SCN: ABNORMAL
CHLORIDE SERPL-SCNC: 103 MMOL/L (ref 98–107)
CREAT SERPL-MCNC: 1 MG/DL (ref 0.67–1.17)
EGFRCR SERPLBLD CKD-EPI 2021: >90 ML/MIN/1.73M2
EOSINOPHIL # BLD AUTO: 0.1 10E3/UL (ref 0–0.7)
EOSINOPHIL NFR BLD AUTO: 1 %
ERYTHROCYTE [DISTWIDTH] IN BLOOD BY AUTOMATED COUNT: 13.6 % (ref 10–15)
ETHANOL SERPL-MCNC: 0.12 G/DL
FENTANYL UR QL: ABNORMAL
GLUCOSE SERPL-MCNC: 90 MG/DL (ref 70–99)
HCO3 SERPL-SCNC: 20 MMOL/L (ref 22–29)
HCT VFR BLD AUTO: 46 % (ref 40–53)
HGB BLD-MCNC: 15.1 G/DL (ref 13.3–17.7)
HOLD SPECIMEN: NORMAL
HOLD SPECIMEN: NORMAL
IMM GRANULOCYTES # BLD: 0 10E3/UL
IMM GRANULOCYTES NFR BLD: 0 %
LYMPHOCYTES # BLD AUTO: 3.2 10E3/UL (ref 0.8–5.3)
LYMPHOCYTES NFR BLD AUTO: 35 %
MCH RBC QN AUTO: 25.9 PG (ref 26.5–33)
MCHC RBC AUTO-ENTMCNC: 32.8 G/DL (ref 31.5–36.5)
MCV RBC AUTO: 79 FL (ref 78–100)
MONOCYTES # BLD AUTO: 0.5 10E3/UL (ref 0–1.3)
MONOCYTES NFR BLD AUTO: 5 %
NEUTROPHILS # BLD AUTO: 5.4 10E3/UL (ref 1.6–8.3)
NEUTROPHILS NFR BLD AUTO: 58 %
NRBC # BLD AUTO: 0 10E3/UL
NRBC BLD AUTO-RTO: 0 /100
OPIATES UR QL SCN: ABNORMAL
PCP QUAL URINE (ROCHE): ABNORMAL
PLATELET # BLD AUTO: 296 10E3/UL (ref 150–450)
POTASSIUM SERPL-SCNC: 3.5 MMOL/L (ref 3.4–5.3)
PROT SERPL-MCNC: 7.7 G/DL (ref 6.4–8.3)
RBC # BLD AUTO: 5.82 10E6/UL (ref 4.4–5.9)
SODIUM SERPL-SCNC: 142 MMOL/L (ref 135–145)
WBC # BLD AUTO: 9.3 10E3/UL (ref 4–11)

## 2024-12-23 PROCEDURE — 80053 COMPREHEN METABOLIC PANEL: CPT | Performed by: EMERGENCY MEDICINE

## 2024-12-23 PROCEDURE — 80307 DRUG TEST PRSMV CHEM ANLYZR: CPT | Performed by: EMERGENCY MEDICINE

## 2024-12-23 PROCEDURE — 82077 ASSAY SPEC XCP UR&BREATH IA: CPT | Performed by: EMERGENCY MEDICINE

## 2024-12-23 PROCEDURE — 85004 AUTOMATED DIFF WBC COUNT: CPT | Performed by: EMERGENCY MEDICINE

## 2024-12-23 PROCEDURE — 99285 EMERGENCY DEPT VISIT HI MDM: CPT

## 2024-12-23 PROCEDURE — 82010 KETONE BODYS QUAN: CPT | Performed by: EMERGENCY MEDICINE

## 2024-12-23 PROCEDURE — 36415 COLL VENOUS BLD VENIPUNCTURE: CPT | Performed by: EMERGENCY MEDICINE

## 2024-12-23 RX ORDER — OLANZAPINE 10 MG/1
10 TABLET ORAL 2 TIMES DAILY PRN
Status: DISCONTINUED | OUTPATIENT
Start: 2024-12-23 | End: 2024-12-23 | Stop reason: HOSPADM

## 2024-12-23 ASSESSMENT — ACTIVITIES OF DAILY LIVING (ADL)
ADLS_ACUITY_SCORE: 41

## 2024-12-23 NOTE — ED NOTES
Pt discharged to the lobby to wait for his mother. Alerted security that pt is to remain in lobby until mother arrives as he was walking into traffic prior to coming to ED.

## 2024-12-23 NOTE — ED NOTES
Mother Maria Isabel is en route to  patient. Patient has an outpatient psych appointment later today.

## 2024-12-23 NOTE — PLAN OF CARE
Darwni JAMES Todd  December 23, 2024  Plan of Care Hand-off Note     Patient Recommended Care Path: discharge    Clinical Substantiation:  Pt presents to the ED due to engaging in unsafe behaviors after he was consuming alcohol.  Pt has a diagnostic history of EVGENY, MDD and Schizoid Personality Disorder, as well as experiencing past trauma.  He is not currently engaged in any outpatient mental health services and is not taking medications.  Pt has an overall distrust of others and societal systems in general as well as some paranoia.  He denies hallucinations and denies SI/SIB/HI.  He has an intake appointment scheduled today at 1pm at [facility] to assess what services he can be connected with as far as therapy, in home services, case management, medication management.  As pt is not appearing to be a threat to himself or others, it would be most beneficial for pt to follow up with this appointment to continue with outpatient care.    Goals for crisis stabilization:  Increasing motivation to follow through with outpatient services.    Next steps for Care Team:  Reinforcing importance of attending appointment today at [facility].    Treatment Objectives Addressed:  rapport building, processing feelings, identifying an appropriate aftercare plan    Therapeutic Interventions:  Engaged in cognitive restructuring/ reframing, looked at common cognitive distortions and challenged negative thoughts., Engaged in guided discovery, explored patient's perspectives and helped expand them through socratic dialogue.    Has a specific means been identified for suicidal.homicide actions: No      Patient coping skills attempted to reduce the crisis:  No coping skills applied.                  Collateral contact information:  Maria Isabel Hernandez mother- [phone]    Legal Status: Voluntary/Patient has signed consent for treatment                                                                                                                                  Reviewed court records: yes     Psychiatry Consult: no    Jocelyn Kehr Sparby, LPCC, LADC

## 2024-12-23 NOTE — ED PROVIDER NOTES
I assumed care for this patient after signout from my colleague.  Plan at time of signout was to follow-up with DEC after their assessment.  I discussed the patient with the DEC .  They were able to speak to the patient and the patient's mother.  He lives with his mother, and it sounds like he did not necessarily run in front of traffic, which was initially reported, but instead got out of the patient's mother's car and she turned around to pick him up and he went in front of her vehicle.  She states that he has been saying some strange things but is otherwise at his baseline.  He has been drinking more frequently.  He continues to deny suicidal ideation.  The patient's mother states that he has an appointment for outpatient mental health care today and she was very hopeful that he would be able to go to that appointment.  There is not any imminent harm or worry for the patient's safety at this time.  DEC recommends discharge so he can go to his outpatient appointment, and I agree with this.  The patient's mother is agreeable as well and she will  the patient.    Neto Lee MD on 12/23/2024 at 8:57 AM       Neto Lee MD  12/23/24 0857
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
## 2024-12-23 NOTE — DISCHARGE INSTRUCTIONS
Keep your appointment with your provider today.  Please return the emergency department if you develop any new or concerning symptoms.

## 2024-12-23 NOTE — ED TRIAGE NOTES
"BIBA at several businesses trespassed fro harassing patrons. Pt has bipolar hx not taking medications. Bystanders reported seeing pt walking into traffic. Pt reports drinking 6 beers tonight. Pt told ems that the blue light in the ambulance were \"the democrats spying on me\"  Pt VSS.      "

## 2024-12-23 NOTE — CONSULTS
"Diagnostic Evaluation Consultation  Crisis Assessment    Patient Name: Darwin Todd  Age:  24 year old  Legal Sex: male  Gender Identity: male  Pronouns:   Race: White  Ethnicity: Not  or   Language: English      Patient was assessed: Virtual: [employer]   Crisis Assessment Start Date: 12/23/24  Crisis Assessment Start Time: 0731  Crisis Assessment Stop Time: 0818  Patient location: [facility] EMERGENCY DEPT                             ED15    Referral Data and Chief Complaint  Darwin Todd presents to the ED via EMS. Patient is presenting to the ED for the following concerns: Significant behavioral change, Substance use, Intoxication. Factors that make the mental health crisis life threatening or complex are: Per the triage note, PD was called after the patient was found trespassing at several businesses and harassing several patrons. The patient was also seen walking into traffic.   When asked what prompted ED admission he states, \"something stupid and not worth your time\".  He described that he was walking to [employer] and then reports he was walking from the bar to the police station to ask them why they can let people drive home from the bar without consequences and  states, \"they were looking at me like I'm crazy\".  Pt reports he was walking in the road because he didn't want to walk on the snow.  He states he drank 6 beers prior to that and has been drinking the past few days because he's had PTO.  He reports normally he drinks every other Friday, so this is an increase in his usual frequency of alcohol use.  During interview pt states he doesn't trust anyone and says things like \"...maybe they can't do anything right now because the democratic rule\" and \"\"mental health doesn't actually exist, it's for the white people get degrees and sit around and listen to peoples problems\".  Pt denies SI/SIB/HI or hallucinations..      Informed Consent and Assessment Methods  Explained the " "crisis assessment process, including applicable information disclosures and limits to confidentiality, assessed understanding of the process, and obtained consent to proceed with the assessment.  Assessment methods included conducting a formal interview with patient, review of medical records, collaboration with medical staff, and obtaining relevant collateral information from family and community providers when available.  : done     History of the Crisis   Pt reports a history of ADHD and denies being prescribed any medications currently and is not engaged in therapy.  Pt states he had seen a therapist in the past for about 6 months and states \"the only reason I went was to see if I could sleep with her\". Pt presented to ED on 11/29/24 with similar presentation of intoxication and behavior change.  Pt completed [facility] assessment and was recommended for outpatient therapy and medication management.  Collateral from pt's mother indicates pt has an intake appointment today at 1pm at [facility] to place in appropriate outpatient services (therapy, case management, psychiatry per mother).  Pt also indicated having an appointment today at [facility] and [facility] which he wasn't entirely sure about.  Pt's mother reports pt's mental health has declined the past two years, after being shot by the police.  Pt also reports a history of other adverse childhood experiences which he reports still being impacted by.  Per chart review, pt was admitted for psychiatric hospitalization at [facility] 3/2023 and was diagnosed with Schizoid Personality Disorder and was started on Seroquel.  Pt also has diagnostic history of EVGENY and MDD.  Pt denies past suicide attempts or self harm.  He denies hallucinations however collateral from pt's mother reports pt has been making statements that the [facility]  er chart review and collateral contact from pt's mother, pt was for psychiatric hospitalization March 2023 for a mental health " crisis. At that time pt was placed on a stay of commitment from 3/2023 to 9/2023, per [place] court website.  Pt's mother reports pt has been making comments that the government is listening.  She also states that pt has been drinking more this past month and she's been noticing an escalation of his mental health symptoms.    Brief Psychosocial History  Family:  Single, Children no  Support System:  Other (specify) (pt denies any support systems)  Employment Status:  employed full-time  Source of Income:  salary/wages  Financial Environmental Concerns:  unable to afford rent/mortgage  Current Hobbies:  music, interaction with pets, exercise/fitness  Barriers in Personal Life:  lack of companionship, financial concerns, mental health concerns    Significant Clinical History  Current Anxiety Symptoms:  excessive worry  Current Depression/Trauma:  withdrawal/isolation, difficulty concentrating  Current Somatic Symptoms:  excessive worry  Current Psychosis/Thought Disturbance:  grandiosity, dis tractability  Current Eating Symptoms:   (pt denies)  Chemical Use History:  Alcohol:  (most recent daily use)  Last Use:: 12/22/24  Benzodiazepines: None  Opiates: None  Cocaine: None  Marijuana: Daily  Last Use:: 12/09/24  Other Use: None  Withdrawal Symptoms:  (pt denies)  Addictions:  (pt denies)   Past diagnosis:  Depression, Anxiety Disorder, PTSD, Bipolar Disorder  Family history:  Anxiety Disorder, Depression, Substance Use Disorder, Schizophrenia, PTSD, Death by Suicide, Bipolar Disorder  Past treatment:  Individual therapy, Primary Care, Psychiatric Medication Management  Details of most recent treatment:  No recent treatment.  Other relevant history:  no additional history beyond what was reported    Have there been any medication changes in the past two weeks:  no       Is the patient compliant with medications:   (n/a)        Collateral Information  Is there collateral information: Yes     Collateral information name,  "relationship, phone number:  Maria Isabel Hernandez mother- [phone]    What happened today: Maria Isabel reports she was concerned about pt being at the bar again and had been calling him last night while he was at the bar.  Pt said he was bored and has nothing to do.  She was on the phone with him around 9pm and heard him getting annoyed with someone at the bar, swearing and \"throwing the N word around\".  His brother went to the bar to get him and they got into an argument and the brother left.  His mom called the police and went to the bar.  Kendall left the bar and he started walking home after leaving the police, this was around 11pm.  She went to sleep and woke up around 2am and wasn't home yet.  When Maria Isabel called the police he walked into the police department.  She went to pick him up and was bringing him home when she was coming to a stop he got out of the car and started walking, then jumped in front of her car.  During this time police got involved again, then was transported to the hospital.     What is different about patient's functioning: Maria Isabel states, the past week and a half he's \"strung out of control\" and has been drinking more (9 days straight).  She reports he makes a weird impact in the public where people don't understand him.  She reports in the past few days he's made comments that he thinks the government is listening.  Maria Isabel reports she can handle the strange things he says and odd behaviors, however the past month or so he's been drinking more and his symptoms increase and states he can't be drinking.     What do you think the patient needs:      Has patient made comments about wanting to kill themselves/others: no    If d/c is recommended, can they take part in safety/aftercare planning:  yes    Additional collateral information:        Risk Assessment  [place] Suicide Severity Rating Scale Full Clinical Version:  Suicidal Ideation  Q6 Suicide Behavior (Lifetime): no          [place] Suicide " Severity Rating Scale Recent:   Suicidal Ideation (Recent)  Q1 Wished to be Dead (Past Month): no  Q2 Suicidal Thoughts (Past Month): no  Level of Risk per Screen: no risks indicated     Suicidal Behavior (Recent)  Actual Attempt (Past 3 Months): No  Has subject engaged in non-suicidal self-injurious behavior? (Past 3 Months): No  Interrupted Attempts (Past 3 Months): No  Aborted or Self-Interrupted Attempt (Past 3 Months): No  Preparatory Acts or Behavior (Past 3 Months): No    Environmental or Psychosocial Events: challenging interpersonal relationships, impulsivity/recklessness, other life stressors, social isolation, ongoing abuse of substances  Protective Factors: Protective Factors: strong bond to family unit, community support, or employment, responsibilities and duties to others, including pets and children, lives in a responsibly safe and stable environment    Does the patient have thoughts of harming others? Feels Like Hurting Others: no  Previous Attempt to Hurt Others: no  Current presentation:  (pt presents as calm and cooperative)  Is the patient engaging in sexually inappropriate behavior?: no  Does Patient have a known history of aggressive behavior: Yes  Where/who has aggression been against (people, property, self, etc): People  When was the last episode of aggression: Per chart review pt has been verbally aggressive and threatening 11/29/204 and 3/23/2023  Where has the violence occurred (home, community, school): Home and community  Trigger to aggression (if known): Appears triggers are substance use, mental health symptoms and interactions with others where he becomes annoyed or agitated.  Has aggression occurred as a result of MH concerns/diagnosis: Yes  Does patient have history of aggression in hospital: Yes, per chart review.    Is the patient engaging in sexually inappropriate behavior?  no        Mental Status Exam   Affect: Appropriate  Appearance: Appropriate  Attention  Span/Concentration: Attentive  Eye Contact: Variable    Fund of Knowledge: Appropriate   Language /Speech Content: Fluent  Language /Speech Volume: Normal  Language /Speech Rate/Productions: Normal  Recent Memory: Intact  Remote Memory: Intact  Mood: Apathetic  Orientation to Person: Yes   Orientation to Place: Yes  Orientation to Time of Day: Yes  Orientation to Date: Yes     Situation (Do they understand why they are here?): Yes  Psychomotor Behavior: Normal  Thought Content: Paranoia  Thought Form: Intact, Loose Associations     Mini-Cog Assessment  Number of Words Recalled:    Clock-Drawing Test:     Three Item Recall:    Mini-Cog Total Score:       Medication  Psychotropic medications:   Medication Orders - Psychiatric (From admission, onward)      Start     Dose/Rate Route Frequency Ordered Stop    12/23/24 0403  OLANZapine (zyPREXA) tablet 10 mg         10 mg Oral 2 TIMES DAILY PRN 12/23/24 0403               Current Care Team  Patient Care Team:  Ghazal Jeffries MD as PCP - General (Family Medicine)  Ivette Estrada PA-C as Physician Assistant (Dermatology)  Ayanna Triana as Personal Advocate & Liaison (PAL) (Family Medicine)  Yobani Ng MD as Assigned Musculoskeletal Provider  Ghazal Jeffries MD as Assigned PCP    Diagnosis  Patient Active Problem List   Diagnosis Code    Prediabetes R73.03    Hyperlipidemia LDL goal <100 E78.5    Sickle cell trait (H) D57.3    Mild major depression (H) F32.0    Anxiety F41.9    Marijuana use F12.90    History of iron deficiency anemia Z86.2    Environmental allergies Z91.09    Raynaud's disease without gangrene I73.00    Unspecified mood (affective) disorder (H) F39    Generalized anxiety disorder F41.1    Alcohol intoxication (H) F10.929       Primary Problem This Admission  Active Hospital Problems    *Unspecified mood (affective) disorder (H)        Clinical Summary and Substantiation of Recommendations   Clinical Substantiation:  Pt presents to the ED due to  engaging in unsafe behaviors after he was consuming alcohol.  Pt has a diagnostic history of EVGENY, MDD and Schizoid Personality Disorder, as well as experiencing past trauma.  He is not currently engaged in any outpatient mental health services and is not taking medications.  Pt has an overall distrust of others and societal systems in general as well as some paranoia.  He denies hallucinations and denies SI/SIB/HI.  He has an intake appointment scheduled today at 1pm at [facility] to assess what services he can be connected with as far as therapy, in home services, case management, medication management.  As pt is not appearing to be a threat to himself or others, it would be most beneficial for pt to follow up with this appointment to continue with outpatient care.    Goals for crisis stabilization:  Increasing motivation to follow through with outpatient services.    Next steps for Care Team:  Reinforcing importance of attending appointment today at [facility].    Treatment Objectives Addressed:  rapport building, processing feelings, identifying an appropriate aftercare plan    Therapeutic Interventions:  Engaged in cognitive restructuring/ reframing, looked at common cognitive distortions and challenged negative thoughts., Engaged in guided discovery, explored patient's perspectives and helped expand them through socratic dialogue.    Has a specific means been identified for suicidal/homicide actions: No    If yes, describe:       Explain action steps toward mitigation:       Document completion of mitigation actions:       The follow up action still needed prior to discharge:       Patient coping skills attempted to reduce the crisis:  No coping skills applied.    Disposition  Recommended referrals: Individual Therapy, Medication Management        Reviewed case and recommendations with attending provider. Attending Name: Dr. Lee       Attending concurs with disposition: yes        Patient and/or validated legal guardian concurs with disposition:   yes       Final disposition:  discharge                            Legal status: Voluntary/Patient has signed consent for treatment                                                                                                                                 Reviewed court records: yes       Assessment Details   Total duration spent with the patient: 47 min     CPT code(s) utilized: 84137 - Psychotherapy for Crisis - 60 (30-74*) min    Jocelyn Kehr Sparby, LPCC, VINNIE, Psychotherapist  [facility] - Triage & Transition Services  Callback: [phone]

## 2025-02-07 ENCOUNTER — OFFICE VISIT (OUTPATIENT)
Dept: FAMILY MEDICINE | Facility: CLINIC | Age: 25
End: 2025-02-07
Payer: COMMERCIAL

## 2025-02-07 VITALS
DIASTOLIC BLOOD PRESSURE: 66 MMHG | HEART RATE: 67 BPM | TEMPERATURE: 98.1 F | WEIGHT: 185.8 LBS | SYSTOLIC BLOOD PRESSURE: 114 MMHG | HEIGHT: 67 IN | RESPIRATION RATE: 20 BRPM | BODY MASS INDEX: 29.16 KG/M2 | OXYGEN SATURATION: 100 %

## 2025-02-07 DIAGNOSIS — R10.9 ABDOMINAL DISCOMFORT: ICD-10-CM

## 2025-02-07 DIAGNOSIS — Z11.3 SCREEN FOR STD (SEXUALLY TRANSMITTED DISEASE): Primary | ICD-10-CM

## 2025-02-07 DIAGNOSIS — F31.61 BIPOLAR DISORDER, CURRENT EPISODE MIXED, MILD (H): ICD-10-CM

## 2025-02-07 PROCEDURE — 99213 OFFICE O/P EST LOW 20 MIN: CPT | Performed by: FAMILY MEDICINE

## 2025-02-07 PROCEDURE — 87591 N.GONORRHOEAE DNA AMP PROB: CPT | Performed by: FAMILY MEDICINE

## 2025-02-07 PROCEDURE — 87491 CHLMYD TRACH DNA AMP PROBE: CPT | Performed by: FAMILY MEDICINE

## 2025-02-07 ASSESSMENT — PATIENT HEALTH QUESTIONNAIRE - PHQ9: SUM OF ALL RESPONSES TO PHQ QUESTIONS 1-9: 3

## 2025-02-07 ASSESSMENT — PAIN SCALES - GENERAL: PAINLEVEL_OUTOF10: NO PAIN (0)

## 2025-02-07 NOTE — PROGRESS NOTES
"  Assessment & Plan     (Z11.3) Screen for STD (sexually transmitted disease)  (primary encounter diagnosis)  Comment: exposure to Std ,   Plan: Chlamydia trachomatis/Neisseria gonorrhoeae by         PCR - Clinic Collect, HIV Antigen Antibody         Combo  We did discuss to repeat HIV antibody test due to high risk exposure .            (F31.61) Bipolar disorder, current episode mixed, mild (H)  Comment: appointment scheduled with the Psychiatrist .     (R10.9) Abdominal discomfort  Likely due to IBS  , we discussed diet modification .          BMI  Estimated body mass index is 29.54 kg/m  as calculated from the following:    Height as of this encounter: 1.689 m (5' 6.5\").    Weight as of this encounter: 84.3 kg (185 lb 12.8 oz).   Weight management plan: Discussed healthy diet and exercise guidelines        Anai Argueta is a 24 year old, presenting for the following health issues:  RECHECK (Would like colonoscopy, no GI issues, family hx of stomach issues, colon cancer)      2/7/2025    12:48 PM   Additional Questions   Roomed by Gaby   Accompanied by self     HPI     In clinic for concern of STD exposure .      Review of Systems  CONSTITUTIONAL: NEGATIVE for fever, chills, change in weight  ENT/MOUTH: NEGATIVE for ear, mouth and throat problems  RESP: NEGATIVE for significant cough or SOB  CV: NEGATIVE for chest pain, palpitations or peripheral edema      Objective    /66 (BP Location: Right arm, Patient Position: Sitting, Cuff Size: Adult Large)   Pulse 67   Temp 98.1  F (36.7  C) (Oral)   Resp 20   Ht 1.689 m (5' 6.5\")   Wt 84.3 kg (185 lb 12.8 oz)   SpO2 100%   BMI 29.54 kg/m    Body mass index is 29.54 kg/m .  Physical Exam   GENERAL: alert and no distress  RESP: lungs clear to auscultation - no rales, rhonchi or wheezes  CV: regular rate and rhythm, normal S1 S2, no S3 or S4, no murmur, click or rub, no peripheral edema  ABDOMEN: soft, nontender, no hepatosplenomegaly, no masses and " bowel sounds normal  MS: no gross musculoskeletal defects noted, no edema  SKIN: no suspicious lesions or rashes  NEURO: Normal strength and tone, mentation intact and speech normal  PSYCH: mentation appears normal, affect normal/bright          Signed Electronically by: Ghazal Jeffries MD

## 2025-02-08 LAB
C TRACH DNA SPEC QL PROBE+SIG AMP: NEGATIVE
N GONORRHOEA DNA SPEC QL NAA+PROBE: NEGATIVE
SPECIMEN TYPE: NORMAL